# Patient Record
Sex: FEMALE | Race: ASIAN | NOT HISPANIC OR LATINO | Employment: FULL TIME | URBAN - METROPOLITAN AREA
[De-identification: names, ages, dates, MRNs, and addresses within clinical notes are randomized per-mention and may not be internally consistent; named-entity substitution may affect disease eponyms.]

---

## 2017-06-27 ENCOUNTER — OFFICE VISIT (OUTPATIENT)
Dept: INTERNAL MEDICINE | Facility: MEDICAL CENTER | Age: 37
End: 2017-06-27
Payer: MEDICAID

## 2017-06-27 VITALS
HEIGHT: 64 IN | WEIGHT: 187.25 LBS | SYSTOLIC BLOOD PRESSURE: 108 MMHG | OXYGEN SATURATION: 98 % | TEMPERATURE: 97.9 F | DIASTOLIC BLOOD PRESSURE: 76 MMHG | HEART RATE: 75 BPM | BODY MASS INDEX: 31.97 KG/M2

## 2017-06-27 DIAGNOSIS — N94.6 DYSMENORRHEA: ICD-10-CM

## 2017-06-27 DIAGNOSIS — N80.9 ENDOMETRIOSIS: ICD-10-CM

## 2017-06-27 DIAGNOSIS — R68.89 HEAT INTOLERANCE: ICD-10-CM

## 2017-06-27 DIAGNOSIS — K21.9 GASTROESOPHAGEAL REFLUX DISEASE WITHOUT ESOPHAGITIS: ICD-10-CM

## 2017-06-27 DIAGNOSIS — G43.019 INTRACTABLE MIGRAINE WITHOUT AURA AND WITHOUT STATUS MIGRAINOSUS: ICD-10-CM

## 2017-06-27 DIAGNOSIS — R14.0 BLOATING: ICD-10-CM

## 2017-06-27 DIAGNOSIS — Z83.3 FAMILY HISTORY OF DIABETES MELLITUS: ICD-10-CM

## 2017-06-27 DIAGNOSIS — E66.09 NON MORBID OBESITY DUE TO EXCESS CALORIES: ICD-10-CM

## 2017-06-27 PROBLEM — G43.009 MIGRAINE WITHOUT AURA: Status: ACTIVE | Noted: 2017-06-27

## 2017-06-27 PROCEDURE — 99204 OFFICE O/P NEW MOD 45 MIN: CPT | Performed by: INTERNAL MEDICINE

## 2017-06-27 RX ORDER — SUMATRIPTAN 50 MG/1
50 TABLET, FILM COATED ORAL
Qty: 10 TAB | Refills: 3 | Status: SHIPPED | OUTPATIENT
Start: 2017-06-27 | End: 2018-10-01

## 2017-06-27 RX ORDER — NAPROXEN 500 MG/1
TABLET ORAL
Qty: 30 TAB | Refills: 1 | Status: SHIPPED | OUTPATIENT
Start: 2017-06-27 | End: 2018-10-01

## 2017-06-27 ASSESSMENT — PATIENT HEALTH QUESTIONNAIRE - PHQ9: CLINICAL INTERPRETATION OF PHQ2 SCORE: 0

## 2017-06-27 NOTE — PROGRESS NOTES
New Patient to Establish    Reason to establish: New patient to establish    CC: Here to establish and discuss abdominal bloating and migraines    HPI: Ms. David is a 37-year-old woman with a history of migraines who presents as a new patient.  She states that she was diagnosed with migraines around the age of 16. She is evaluated by a neurologist approximately 10 years ago who she thinks prescribed a prophylactic medication. She thinks she had a head CT 10 years ago that was negative for an obvious cause of migraines. She stopped taking it after one or 2 months. She does not remember why. She has headaches approximately 2 or 3 times per month. The headaches can last from hours to at the most 2 days. On average headaches last 6-12 hours. The headaches are described as dull mostly in the left temporal region. There is associated nausea but no vomiting. There is no aura prior to development of head pain. She has triggers of dehydration and lack of food. 800 mg of ibuprofen can be effective for aborting the headache, however she does not take this until one or 2 hours as passed. She is rather adverse to taking medications for migraines. Applying pressure to her glabellar region or bilateral occipital regions in her skull can help with the pain. Occasionally caffeinated products such as soda can help to improve the headache. The headache burden is about a 5 out of 10 on severity or quality of life. She has never tried abortive agents including triptan therapy. She denies any seizures, numbness, weakness, bowel or bladder problems or change in vision.    In October 2016, she presented to the emergency department with abdominal pain and nausea. She states that she has significant bloating that occurred over a 24-hour period with nonspecific pain in the epigastric region and nausea. She had no right upper quadrant pain. She also had loose stool but no melena no hematochezia hematemesis or vomiting. An ultrasound in the  emergency department revealed gallbladder stones and sludge but no cholecystitis. She was given morphine and fluids and most her symptoms resolved by the time she left the emergency department. It was unlikely that she had cholecystitis or appendicitis. Since then she describes occasional symptoms of bloating and epigastric pain with heartburn. This occurs about 2 times per month. She takes over-the-counter omeprazole which usually works to relieve all of the symptoms. She states that there is a strong relationship between the symptoms and eating spicy foods, greasy foods, drinking alcohol or drinking sodas. Usually if she avoids these foods she has no problems. She denies any abdominal pain nausea or vomiting outside of these events. She does not smoke tobacco she drinks alcohol rarely however she does use peppermint oil because someone told her that I can help with abdominal pain. She denies any melena, hematochezia, hematemesis, weight loss, odynophagia, dysphagia.    She has a history of endometriosis but currently does not have a gynecologist. Associated with this has been dysmenorrhea. She also describes irregular menses. She did consult  earlier this year. Unfortunately she was unable to conceive. She currently is not working on that.          Patient Active Problem List    Diagnosis Date Noted   • Migraine without aura 06/27/2017   • Gastroesophageal reflux disease without esophagitis 06/27/2017   • Bloating 06/27/2017   • Non morbid obesity due to excess calories 06/27/2017   • Endometriosis 06/27/2017       Past Medical History   Diagnosis Date   • Endometriosis        Current Outpatient Prescriptions   Medication Sig Dispense Refill   • naproxen (NAPROSYN) 500 MG Tab Take one tablet at onset of migraine as needed 30 Tab 1   • sumatriptan (IMITREX) 50 MG Tab Take 1 Tab by mouth Once PRN for Migraine for up to 1 dose. 10 Tab 3     No current facility-administered medications for this  "visit.       Allergies as of 06/27/2017 - Sahil as Reviewed 06/27/2017   Allergen Reaction Noted   • Penicillins Anaphylaxis 07/17/2007   • Promethazine hcl  07/17/2007       Social History     Social History   • Marital Status: Single     Spouse Name: N/A   • Number of Children: N/A   • Years of Education: N/A     Occupational History   • Not on file.     Social History Main Topics   • Smoking status: Never Smoker    • Smokeless tobacco: Never Used   • Alcohol Use: No   • Drug Use: No   • Sexual Activity: Not on file     Other Topics Concern   • Not on file     Social History Narrative       No family history on file.    Past Surgical History   Procedure Laterality Date   • Other orthopedic surgery Left      shoulder       ROS: As per HPI. Additional pertinent symptoms as noted below.    Constitutional: she denies any weight loss, fevers, chills. Admits to significant heat intolerance  Eyes: she denies any recent change in vision, eye pain, redness, double vision, loss of vision  ENT: denies any sinus pain, allergies, sneezing, rhinorrhea, ear pain, decreased hearing, tinnitus  Cardiovascular: denies chest pain, palpitations, orthopnea  Respiratory: denies any cough, hemoptysis, shortness of breath, wheezing  GI: denies constipation or diarrhea melena or hematochezia hematemesis  : denies dysuria.. Does admit to relatively painful menstrual events.  Musculo-skeletal: states that she has intermittent knee and wrist pain without erythema, edema or increased temp  Skin: no atypical nevi, ulcerations or rash  Neurological: no seizures, weakness, numbness  Psychological: no depression or anxiety    /76 mmHg  Pulse 75  Temp(Src) 36.6 °C (97.9 °F)  Ht 1.619 m (5' 3.74\")  Wt 84.936 kg (187 lb 4 oz)  BMI 32.40 kg/m2  SpO2 98%    Physical Exam  General:  Alert and oriented, No apparent distress.    Eyes: Pupils equal and reactive. No scleral icterus. No sinus pressure or tenderness to palpation Throat: Clear no " erythema or exudates noted. Bilaterally enlarged, cryptic tonsils     Neck: Supple. No lymphadenopathy noted. Thyroid not enlarged.    Lungs: Clear to auscultation and percussion bilaterally.    Cardiovascular: Regular rate and rhythm. No murmurs, rubs or gallops.    Abdomen:  Benign. No rebound or guarding noted.    Extremities: No clubbing, cyanosis, edema.    Skin: Clear. No rash or suspicious skin lesions noted.    Other: Neuro: Cranial nerves II through XII intact.      Assessment and Plan    1. Intractable migraine without aura and without status migrainosus  She most likely has migraines. She has no red flags for deleterious causes a headache. She has headaches 2 or 3 times per month that did not significantly impact her quality of life therefore at this point prophylactic therapy probably not offer much benefit. I did suggest that she try Imitrex plus naproxen at onset of headache. I will send a prescription of both drugs to her pharmacy    2. Gastroesophageal reflux disease without esophagitis  Suspect most of her GI symptoms are secondary to gastric esophageal reflux disease. There is a strong correlation between her diet and reoccurrence of her symptoms. I suggest that she lose weight and stop using peppermint oil. She may also consider going on a reflux friendly diet. At this point she does not have red flags to suggest the need for an EGD.    3. Bloating  Suspectthis is secondary to her poor diet. She eats a lot of caffeinated products and processed foods.    4. Non morbid obesity due to excess calories  Encouraged patient to lose weight which may help her with #2. She should focus on her diet first    5. Heat intolerance  She has complained of heat intolerance per her account moderately severe. Thyroid exam was negative for nodules or goiter. She states that her menstrual periods are becoming irregular over the last year. May be reasonable to check for premature ovarian failure but this is probably not  likely. We'll order TSH and FSH, prolactin, estrogen    6. Dysmenorrhea  She will follow-up with a gynecologist and set up an appointment    7. Family history of diabetes mellitus  Screening her for diabetes and check her lipid panel      Followup: Return in about 6 months (around 12/27/2017).    Signed by: Nael Sweeney M.D.

## 2017-07-24 ENCOUNTER — TELEPHONE (OUTPATIENT)
Dept: INTERNAL MEDICINE | Facility: MEDICAL CENTER | Age: 37
End: 2017-07-24

## 2017-07-24 NOTE — TELEPHONE ENCOUNTER
1. Caller Name: Pt                      Call Back Number: [phone] (home)     2. Message: Patient called and left voicemail asking about the results. Scanned into media and routed over to Dr. Sweeney    3. Patient approves office to leave a detailed voicemail/MyChart message: N\A

## 2017-07-25 DIAGNOSIS — K21.9 GASTROESOPHAGEAL REFLUX DISEASE WITHOUT ESOPHAGITIS: ICD-10-CM

## 2017-07-25 DIAGNOSIS — G43.019 INTRACTABLE MIGRAINE WITHOUT AURA AND WITHOUT STATUS MIGRAINOSUS: ICD-10-CM

## 2017-07-25 DIAGNOSIS — R14.0 BLOATING: ICD-10-CM

## 2017-07-25 DIAGNOSIS — R68.89 HEAT INTOLERANCE: ICD-10-CM

## 2017-07-25 DIAGNOSIS — N94.6 DYSMENORRHEA: ICD-10-CM

## 2017-07-25 NOTE — TELEPHONE ENCOUNTER
Patient was worried about labs. Was able to contact her at her cell number. Home number was a no longer working # for her. Let her know Dr. Sweeney would like to get her to come in to see a resident. She preferred not to come in and just discuss over the phone. I let her know that, that is fine and I will be mailing a copy to her as well.

## 2017-07-25 NOTE — TELEPHONE ENCOUNTER
Called patient again. No answer and unable to leave voicemail.  Patient should set up appointment to discuss labs within resident clinic.

## 2018-03-05 ENCOUNTER — HOSPITAL ENCOUNTER (OUTPATIENT)
Dept: LAB | Facility: MEDICAL CENTER | Age: 38
End: 2018-03-05
Attending: SPECIALIST
Payer: MEDICAID

## 2018-03-07 LAB — CYTOLOGY REG CYTOL: NORMAL

## 2018-08-16 NOTE — H&P
DATE OF SCHEDULED SURGERY:  2018.    REASON FOR SURGERY:  Symptomatic uterine fibroids, associated incapacitating   pelvic pain, dyspareunia, symptomatic pelvic floor relaxation, urinary   incontinence.    HISTORY OF PRESENT ILLNESS:  A 38-year-old  2, para 1, spontaneous    1, negative urine pregnancy test with a progressively worsening   history of bleeding, which has been refractory to hormonal regimens in the   past, now with an enlarged uterus secondary to multiple uterine fibroids,   largest fibroid measuring 5.75x4.32.  The uterus is enlarged measuring   9.08x10.81x10.22.  There are multiple uterine fibroids located throughout the   uterus including pedunculated subserosal and submucosal fibroids near the   lower uterine segment to the right a 2.68x2.58 subserosal pedunculated fibroid   is noted.  Endometrial stripe thickness was difficult to discern given the   submucosal fibroids.  Patient did have an otherwise normal pelvis with trace   free fluid noted.  She was placed on Provera regimen for which she failed.    She has a history of failure to oral contraceptives.  She states that she has   exhausted all conservative management and is now interested in proceeding   forward with attempted definitive surgical correction.  She has proceeded   forward with a workup including endometrial biopsy, which showed proliferative   phase endometrium with acute endometritis for which she was given an   antibiotic course.  She had normal thyroid function study.  Urodynamic studies   did demonstrate and confirm type 2 stress urinary incontinence.  The patient   states that she has exhausted all conservative measures including hormonal   therapy, pelvic floor exercises and other conservative measures, now   interested in proceeding forward with attempted definitive surgical correction   with laparoscopic-assisted vaginal hysterectomy, bilateral salpingectomy.    She is interested in proceeding forward  with ovarian conservation, anterior   and posterior vaginal repair, enterocele repair, perineoplasty, sacrospinous   vault suspension, transobturator tape midurethral sling procedure.  Risks,   benefits, alternatives of all individual portions of the surgery were   addressed with the patient in detail over several visits.  She has asked   appropriate questions, signed the appropriate consents and is wishing to   proceed forward with surgery as planned.  Of note, the patient does understand   the limitations of surgery and addressing her pelvic pain, dyspareunia, and   occasional overactive bladder symptoms, which she states she understands may   be unimproved or worsen with the surgery as described above requiring   additional medical or surgical management even understanding these   limitations, she is wishing to proceed forward with the surgery nonetheless.    PAST MEDICAL HISTORY:  Migraine headaches, otherwise as stated above.    PAST SURGICAL HISTORY:  Left shoulder surgery and laparoscopic surgery for   endometriosis in  and .    OBSTETRICAL HISTORY:  The patient had 1 previous birth vaginally in  and 1   spontaneous .    GYNECOLOGIC HISTORY:  The patient reports completely irregular cycles, which   she describes excessive with passage of large clots with incapacitating   dysmenorrhea, dyspareunia, pelvic pain, large bulge at the vaginal introitus   consistent with significant prolapse, mixed urinary incontinence symptoms with   the predominance of type 2 stress urinary incontinence which was   demonstrating and confirmed on urodynamic study.  She denies any previous   sexually transmitted diseases or pelvic infections.    SOCIAL HISTORY:  She is single.  She denies the use of any alcohol, tobacco,   or recreational drug use.    FAMILY HISTORY:  Noncontributory.  Rest of the review of systems is   unremarkable.    PHYSICAL EXAMINATION:  GENERAL:  She is afebrile, hemodynamically  stable.  VITAL SIGNS:  Can be seen in the EMR.  HEART:  Regular rate and rhythm.  CHEST:  Clear to auscultation bilaterally.  ABDOMEN:  Soft, nondistended, nontender, bowel sounds are present.  PELVIC:  Exam shows an enlarged fibroid uterus with tenderness to palpation at   the uterine fundus.  No adnexal masses were appreciated on bimanual   examination.  Rectovaginal exam was deferred.  EXTREMITIES:  Nontender.      Urodynamic studies did demonstrate and confirm type 2 stress urinary   incontinence.  Remainder of the imaging and laboratory studies are as stated   above.    ASSESSMENT AND PLAN:  A 38-year-old  2, para 1 with symptomatic uterine   fibroid, history of endometriosis, refractory to multiple hormonal regimens   with menometrorrhagia, dysmenorrhea, pelvic pain, dyspareunia, symptomatic   pelvic floor relaxation, mixed urinary incontinence symptoms, strong type 2   stress urinary incontinence component is now interested in proceeding forward   with surgery as described above, even in light of the possibility and   limitations of surgery and not addressing her pain and dyspareunia, especially   in light of her history of endometriosis, she wishes to proceed forward with   surgery nonetheless on 2018.       ____________________________________     MD MIMA ALICEA / KAYLA    DD:  2018 20:45:12  DT:  2018 23:04:51    D#:  [id number]  Job#:  [id number]

## 2018-08-21 DIAGNOSIS — Z01.812 PRE-OPERATIVE LABORATORY EXAMINATION: ICD-10-CM

## 2018-08-21 LAB
ANION GAP SERPL CALC-SCNC: 8 MMOL/L (ref 0–11.9)
BUN SERPL-MCNC: 12 MG/DL (ref 8–22)
CALCIUM SERPL-MCNC: 9.6 MG/DL (ref 8.5–10.5)
CHLORIDE SERPL-SCNC: 105 MMOL/L (ref 96–112)
CO2 SERPL-SCNC: 27 MMOL/L (ref 20–33)
CREAT SERPL-MCNC: 0.54 MG/DL (ref 0.5–1.4)
ERYTHROCYTE [DISTWIDTH] IN BLOOD BY AUTOMATED COUNT: 40 FL (ref 35.9–50)
GLUCOSE SERPL-MCNC: 95 MG/DL (ref 65–99)
HCT VFR BLD AUTO: 41 % (ref 37–47)
HGB BLD-MCNC: 13.5 G/DL (ref 12–16)
MCH RBC QN AUTO: 27.4 PG (ref 27–33)
MCHC RBC AUTO-ENTMCNC: 32.9 G/DL (ref 33.6–35)
MCV RBC AUTO: 83.2 FL (ref 81.4–97.8)
PLATELET # BLD AUTO: 210 K/UL (ref 164–446)
PMV BLD AUTO: 11.8 FL (ref 9–12.9)
POTASSIUM SERPL-SCNC: 3.8 MMOL/L (ref 3.6–5.5)
RBC # BLD AUTO: 4.93 M/UL (ref 4.2–5.4)
SODIUM SERPL-SCNC: 140 MMOL/L (ref 135–145)
WBC # BLD AUTO: 6.9 K/UL (ref 4.8–10.8)

## 2018-08-21 PROCEDURE — 36415 COLL VENOUS BLD VENIPUNCTURE: CPT

## 2018-08-21 PROCEDURE — 85027 COMPLETE CBC AUTOMATED: CPT

## 2018-08-21 PROCEDURE — 80048 BASIC METABOLIC PNL TOTAL CA: CPT

## 2018-09-04 ENCOUNTER — HOSPITAL ENCOUNTER (OUTPATIENT)
Facility: MEDICAL CENTER | Age: 38
End: 2018-09-04
Attending: SPECIALIST | Admitting: SPECIALIST
Payer: MEDICAID

## 2018-09-04 VITALS
TEMPERATURE: 97 F | DIASTOLIC BLOOD PRESSURE: 69 MMHG | OXYGEN SATURATION: 96 % | RESPIRATION RATE: 17 BRPM | WEIGHT: 192.68 LBS | BODY MASS INDEX: 34.14 KG/M2 | HEART RATE: 77 BPM | SYSTOLIC BLOOD PRESSURE: 133 MMHG | HEIGHT: 63 IN

## 2018-09-04 LAB
B-HCG FREE SERPL-ACNC: <5 MIU/ML
IHCGL IHCGL: NEGATIVE MIU/ML

## 2018-09-04 PROCEDURE — A9270 NON-COVERED ITEM OR SERVICE: HCPCS

## 2018-09-04 PROCEDURE — 500854 HCHG NEEDLE, INSUFFLATION FOR STEP: Performed by: SPECIALIST

## 2018-09-04 PROCEDURE — 501577 HCHG TROCAR, STEP 11MM: Performed by: SPECIALIST

## 2018-09-04 PROCEDURE — 160009 HCHG ANES TIME/MIN: Performed by: SPECIALIST

## 2018-09-04 PROCEDURE — 700101 HCHG RX REV CODE 250

## 2018-09-04 PROCEDURE — 160036 HCHG PACU - EA ADDL 30 MINS PHASE I: Performed by: SPECIALIST

## 2018-09-04 PROCEDURE — 84702 CHORIONIC GONADOTROPIN TEST: CPT

## 2018-09-04 PROCEDURE — 501838 HCHG SUTURE GENERAL: Performed by: SPECIALIST

## 2018-09-04 PROCEDURE — 700102 HCHG RX REV CODE 250 W/ 637 OVERRIDE(OP)

## 2018-09-04 PROCEDURE — 502703 HCHG DEVICE, LIGASURE V SEALER: Performed by: SPECIALIST

## 2018-09-04 PROCEDURE — 501330 HCHG SET, CYSTO IRRIG TUBING: Performed by: SPECIALIST

## 2018-09-04 PROCEDURE — 160048 HCHG OR STATISTICAL LEVEL 1-5: Performed by: SPECIALIST

## 2018-09-04 PROCEDURE — 500886 HCHG PACK, LAPAROSCOPY: Performed by: SPECIALIST

## 2018-09-04 PROCEDURE — 501579 HCHG TROCAR, STEP 5MM: Performed by: SPECIALIST

## 2018-09-04 PROCEDURE — 502240 HCHG MISC OR SUPPLY RC 0272: Performed by: SPECIALIST

## 2018-09-04 PROCEDURE — 700111 HCHG RX REV CODE 636 W/ 250 OVERRIDE (IP)

## 2018-09-04 PROCEDURE — A4338 INDWELLING CATHETER LATEX: HCPCS | Performed by: SPECIALIST

## 2018-09-04 PROCEDURE — C1771 REP DEV, URINARY, W/SLING: HCPCS | Performed by: SPECIALIST

## 2018-09-04 PROCEDURE — 160041 HCHG SURGERY MINUTES - EA ADDL 1 MIN LEVEL 4: Performed by: SPECIALIST

## 2018-09-04 PROCEDURE — 88307 TISSUE EXAM BY PATHOLOGIST: CPT

## 2018-09-04 PROCEDURE — 160035 HCHG PACU - 1ST 60 MINS PHASE I: Performed by: SPECIALIST

## 2018-09-04 PROCEDURE — 160002 HCHG RECOVERY MINUTES (STAT): Performed by: SPECIALIST

## 2018-09-04 PROCEDURE — 160029 HCHG SURGERY MINUTES - 1ST 30 MINS LEVEL 4: Performed by: SPECIALIST

## 2018-09-04 DEVICE — SLING TOT OBTRYX I HALO: Type: IMPLANTABLE DEVICE | Site: BLADDER | Status: FUNCTIONAL

## 2018-09-04 RX ORDER — METOCLOPRAMIDE HYDROCHLORIDE 5 MG/ML
10 INJECTION INTRAMUSCULAR; INTRAVENOUS EVERY 4 HOURS PRN
Status: DISCONTINUED | OUTPATIENT
Start: 2018-09-04 | End: 2018-09-04 | Stop reason: HOSPADM

## 2018-09-04 RX ORDER — BUPIVACAINE HYDROCHLORIDE AND EPINEPHRINE 2.5; 5 MG/ML; UG/ML
INJECTION, SOLUTION EPIDURAL; INFILTRATION; INTRACAUDAL; PERINEURAL
Status: DISCONTINUED | OUTPATIENT
Start: 2018-09-04 | End: 2018-09-04 | Stop reason: HOSPADM

## 2018-09-04 RX ORDER — EPINEPHRINE 1 MG/ML
INJECTION INTRAMUSCULAR; INTRAVENOUS; SUBCUTANEOUS
Status: DISCONTINUED
Start: 2018-09-04 | End: 2018-09-04 | Stop reason: HOSPADM

## 2018-09-04 RX ORDER — BUPIVACAINE HYDROCHLORIDE 2.5 MG/ML
INJECTION, SOLUTION EPIDURAL; INFILTRATION; INTRACAUDAL
Status: DISCONTINUED
Start: 2018-09-04 | End: 2018-09-04 | Stop reason: HOSPADM

## 2018-09-04 RX ORDER — ONDANSETRON 2 MG/ML
4 INJECTION INTRAMUSCULAR; INTRAVENOUS EVERY 6 HOURS PRN
Status: DISCONTINUED | OUTPATIENT
Start: 2018-09-04 | End: 2018-09-04 | Stop reason: HOSPADM

## 2018-09-04 RX ORDER — OXYCODONE HCL 5 MG/5 ML
SOLUTION, ORAL ORAL
Status: COMPLETED
Start: 2018-09-04 | End: 2018-09-04

## 2018-09-04 RX ORDER — SODIUM CHLORIDE, SODIUM LACTATE, POTASSIUM CHLORIDE, CALCIUM CHLORIDE 600; 310; 30; 20 MG/100ML; MG/100ML; MG/100ML; MG/100ML
INJECTION, SOLUTION INTRAVENOUS CONTINUOUS
Status: DISCONTINUED | OUTPATIENT
Start: 2018-09-04 | End: 2018-09-04 | Stop reason: HOSPADM

## 2018-09-04 RX ORDER — ACETAMINOPHEN 500 MG
1000 TABLET ORAL EVERY 6 HOURS
Status: DISCONTINUED | OUTPATIENT
Start: 2018-09-04 | End: 2018-09-04 | Stop reason: HOSPADM

## 2018-09-04 RX ORDER — OXYCODONE HYDROCHLORIDE 5 MG/1
10 TABLET ORAL
Status: DISCONTINUED | OUTPATIENT
Start: 2018-09-04 | End: 2018-09-04 | Stop reason: HOSPADM

## 2018-09-04 RX ORDER — SIMETHICONE 80 MG
80 TABLET,CHEWABLE ORAL EVERY 8 HOURS PRN
Status: DISCONTINUED | OUTPATIENT
Start: 2018-09-04 | End: 2018-09-04 | Stop reason: HOSPADM

## 2018-09-04 RX ORDER — ONDANSETRON 2 MG/ML
INJECTION INTRAMUSCULAR; INTRAVENOUS
Status: COMPLETED
Start: 2018-09-04 | End: 2018-09-04

## 2018-09-04 RX ORDER — MEPERIDINE HYDROCHLORIDE 25 MG/ML
INJECTION INTRAMUSCULAR; INTRAVENOUS; SUBCUTANEOUS
Status: COMPLETED
Start: 2018-09-04 | End: 2018-09-04

## 2018-09-04 RX ORDER — BUPIVACAINE HYDROCHLORIDE AND EPINEPHRINE 2.5; 5 MG/ML; UG/ML
INJECTION, SOLUTION INFILTRATION; PERINEURAL
Status: DISCONTINUED | OUTPATIENT
Start: 2018-09-04 | End: 2018-09-04 | Stop reason: HOSPADM

## 2018-09-04 RX ADMIN — FENTANYL CITRATE 50 MCG: 50 INJECTION, SOLUTION INTRAMUSCULAR; INTRAVENOUS at 15:24

## 2018-09-04 RX ADMIN — FENTANYL CITRATE 25 MCG: 50 INJECTION, SOLUTION INTRAMUSCULAR; INTRAVENOUS at 17:30

## 2018-09-04 RX ADMIN — OXYCODONE HYDROCHLORIDE 10 MG: 5 SOLUTION ORAL at 14:03

## 2018-09-04 RX ADMIN — FENTANYL CITRATE 50 MCG: 50 INJECTION, SOLUTION INTRAMUSCULAR; INTRAVENOUS at 13:51

## 2018-09-04 RX ADMIN — SODIUM CHLORIDE, SODIUM LACTATE, POTASSIUM CHLORIDE, CALCIUM CHLORIDE 1000 ML: 600; 310; 30; 20 INJECTION, SOLUTION INTRAVENOUS at 09:20

## 2018-09-04 RX ADMIN — ONDANSETRON 4 MG: 2 INJECTION INTRAMUSCULAR; INTRAVENOUS at 16:52

## 2018-09-04 RX ADMIN — MEPERIDINE HYDROCHLORIDE 25 MG: 25 INJECTION INTRAMUSCULAR; INTRAVENOUS; SUBCUTANEOUS at 13:40

## 2018-09-04 ASSESSMENT — PAIN SCALES - GENERAL
PAINLEVEL_OUTOF10: 5
PAINLEVEL_OUTOF10: 5
PAINLEVEL_OUTOF10: 6
PAINLEVEL_OUTOF10: 8
PAINLEVEL_OUTOF10: 6
PAINLEVEL_OUTOF10: 10
PAINLEVEL_OUTOF10: 7
PAINLEVEL_OUTOF10: 5
PAINLEVEL_OUTOF10: 6
PAINLEVEL_OUTOF10: 0
PAINLEVEL_OUTOF10: 6
PAINLEVEL_OUTOF10: 4
PAINLEVEL_OUTOF10: 6

## 2018-09-04 NOTE — OP REPORT
DATE OF SERVICE:  9/4/2018     PREOPERATIVE DIAGNOSES:  Symptomatic uterine fibroids, associated incapacitating   pelvic pain, dyspareunia, symptomatic pelvic floor relaxation, urinary   incontinence.     POSTOPERATIVE DIAGNOSES:  Same     FINAL PATHOLOGY:  Pending.     PROCEDURES PERFORMED:  Procedure(s):  VAGINAL HYSTERECTOMY SCOPE TOTAL - Wound Class: Clean Contaminated  SALPINGECTOMY - Wound Class: Clean Contaminated  ANTERIOR AND POSTERIOR REPAIR - PERINEOPLASTY - Wound Class: Clean Contaminated  ENTEROCELE REPAIR - Wound Class: Clean Contaminated  BLADDER SLING FEMALE - TOT - Wound Class: Clean Contaminated  VAGINAL SUSPENSION - SACROSPINOUS VAULT - Wound Class: Clean Contaminated     SURGEON:  Atul Aguilera MD.     ASSISTANT:  Maco Ybarra MD.     ANESTHESIA:  General     ANESTHESIOLOGIST:  Anesthesiologist: Aida Chiu M.D.     ESTIMATED BLOOD LOSS FOR THE PROCEDURE:  100 mL.     FINDINGS:  Bulbous fibroid uterus weighing 517 gms with normal appearing adnexa and pelvis. Good support of the anterior and posterior vaginal  walls in addition to apical vaginal tissue without any undue tension in the  suture sites.  Cystoscopy revealed normal urinary bladder, bilateral ureteral  efflux, and no undue tension noted at the level of the mid urethra after a sling placement on cystoscopic evaluation.     DESCRIPTION OF PROCEDURE:  The patient was taken to the operating room, where  general anesthesia was performed without difficulty.  The patient was then    prepped and draped in usual sterile fashion with lower extremities placed in    Bill stirrups.  Attention was first turned to the perineum, where a weighted    speculum was placed with the use of Galaviz retractor.  Single-toothed tenaculum    was placed on the anterior lip of the cervix.  Hulka uterine manipulator was    then placed.  Single-toothed tenaculum and retractors were then removed.     Attention was then turned to the umbilicus, where a 1 cm  incision was made.     Veress needle was placed, 3.5 L of carboperitoneum were then obtained.  A 10    mm trocar port was then placed.  Two separate ports were placed approximately    one-third of the way from the anterior-superior iliac spine lateral to the    rectus muscle under direct laparoscopic visualization with 5 mm ports placed.     Attention was then turned to the perineum, where a weighted speculum was    placed with the use of Galaviz retractors.  Single-toothed tenaculum was placed    on the anterior lip of the cervix.  Hulka uterine manipulator was then placed.    Single-toothed tenaculum and retractor was then removed.  Attention was then   turned to the umbilicus, where a 1 cm incision was made.  A Veress needle was   placed, 3.5 L of carboperitoneum were then obtained.  A 10 mm trocar port was   then placed.  Two separate ports were placed approximately one-third of the    way from the anterior-superior iliac spine lateral to the rectus muscle under    direct laparoscopic visualization.  Attention was then turned to the distal    portion of the fallopian tubes, which were grasped just below the fallopian    tube above the ovary.  This area was grasped, cauterized, and transected on    each side.  The mesosalpinx underneath the fallopian tube was then grasped,    cauterized, and transected all the way to the level of the uterus, freeing up    the fallopian tube on each side.  The uteroovarian, broad, and round ligaments   were individually isolated, cauterized, and transected.  The vesicouterine    peritoneum was grasped, incised, and the bladder flap was created.  Uterine    vessels were then clamped, cauterized, and transected  on each side.  The    vesicouterine peritoneum was then completed.  Attention was then turned to the   perineum, where a weighted speculum was placed with the use of Galaviz    retractor.  A thyroid tenaculum was placed on the anterior lip, one on the    posterior lips of the  cervix.  Cervix was then injected with 10 mL of 0.25%    Marcaine with epinephrine.  Incision was made starting anteriorly, proceeding    posterior, proceeding back anterior once again.  With the use of Bovie    electrocautery, after injecting 10 mL of 0.25% Marcaine with epinephrine, the    anterior cul-de-sac was entered sharply.  Right angle Talon retractor was    placed upon sharp entry in the anterior cul-de-sac.  Large duck billed    speculum was placed upon sharp entry in the posterior cul-de-sac.  Uterosacral   cardinal complexes were then grasped with ZED clamps, transected, and suture    ligated with 0 Vicryl suture bilaterally.  Attention was then turned to the    perineum, where the uterus and both fallopian tubes were then handed off the    field as specimen after morcellation of the central core portion of the uterus was removed to allow easy removal of remaining portion of the uterus and fallopian tubes.  Excellent hemostasis noted at all vascular pedicles. The anterior and posterior leaf of the peritoneum in addition to the uterosacral    cardinal complexes were reapproximated in the midline with a pursestring    suture using 2-0 Vicryl.  The vaginal cuff was then reapproximated with    figure-of-eight sutures using 0 Vicryl reapproximating both uterosacral    cardinal complex and respective vaginal apices.  The anterior vaginal mucosa    was then injected with 10 mL of 0.25% Marcaine with epinephrine.  The vaginal    mucosa was then dissected off the underlying pubocervical fascia tara until    the levator muscles were then reached.  Horizontal mattress sutures were then    used to reapproximate the pubocervical fascia in midline in a double 0    closure, thereby reducing the midline cystocele.  A 10 mL of 0.25% Marcaine    with epinephrine were injected with 5 mL on the right and 5 mL the left    lateral to the clitoris in the labial crural fold followed by stab incision    made with the use of  #11 blade scalpel followed by placement of Obtryx halo    needles through the labial crural incision sites through the obturator    membrane out through the vaginal mucosal incision at the level of the mid    urethra.  The sling was then applied to the respective Obtryx halo needles and   it was taken back out through their original track.  Tensioning and position    was then performed.  Merino catheter was removed, which had been placed at the    beginning of the case for placement of a 30-degree cystoscope, which revealed    normal urinary bladder,  bilateral ureteral efflux, and no undue tension noted   at the level of the mid urethra.  The sling had been released under direct    cystoscopic evaluation.  Tensioning position was then finalized.  Cystoscope    removed.  Merino catheter replaced.  All excess vaginal mucosa and sling    material were then excised.  The vaginal mucosa was then reapproximated with    2-0 Vicryl in a running interlocking fashion in one segment.  Posterior    vaginal mucosa was then injected with 10 mL of 0.25% Marcaine with    epinephrine.  The vaginal mucosa was then dissected off the underlying    rectovaginal fascia tara until the levator muscles were then reached.     Dissection of the sacrospinous process and ischial spine was then performed on   the right, 3 cm medial along the sacrospinous ligament with straight    catheterization needle device, 0 Ethibond suture was taken through the    sacrospinous ligament and taken out through the right apical portion of the    vaginal cuff on the underlying vaginal mucosa.  The rectovaginal septum was    then reapproximated in the posterior aspect of the vaginal cuff and a double    pursestring suture, thereby reducing a large enterocele located at the    superior aspect of the dissection.  Horizontal mattress suture was then used    to reapproximate the rectovaginal fascia in the midline in a double 0 closure,   thereby reducing the midline  rectocele.  Excess vaginal mucosa was then    trimmed.  The vaginal mucosa was then reapproximated with 2-0 Vicryl in a    running interlocking fashion in one segment for a perineoplasty on the    perineum.  Attention was then turned back to the abdomen and pelvis.  Pelvis    was inspected and noted to be hemostatic, 3.5 L of carboperitoneum removed    followed by removal of the ports under direct laparoscopic visualization.  The   incisions were then reapproximated with single interrupted sutures using 4-0    Vicryl, injected with 20 mL of 0.25% Marcaine with epinephrine.  The patient    tolerated the procedure well and was awoken from general anesthesia, was taken   to the recovery room in stable condition.        ____________________________________     ABHIJIT KIM MD

## 2018-09-04 NOTE — OR SURGEON
Immediate Post OP Note    PreOp Diagnosis: Symptomatic uterine fibroids, associated incapacitating   pelvic pain, dyspareunia, symptomatic pelvic floor relaxation, urinary   incontinence.    PostOp Diagnosis: Same; final pathology pending    Procedure(s):  VAGINAL HYSTERECTOMY SCOPE TOTAL - Wound Class: Clean Contaminated  SALPINGECTOMY - Wound Class: Clean Contaminated  ANTERIOR AND POSTERIOR REPAIR - PERINEOPLASTY - Wound Class: Clean Contaminated  ENTEROCELE REPAIR - Wound Class: Clean Contaminated  BLADDER SLING FEMALE - TOT - Wound Class: Clean Contaminated  VAGINAL SUSPENSION - SACROSPINOUS VAULT - Wound Class: Clean Contaminated    Surgeon(s):  YINKA Freed M.D.    Anesthesiologist/Type of Anesthesia:  Anesthesiologist: Aida Chiu M.D./General    Surgical Staff:  Circulator: Eileen Kirby R.N.  Relief Circulator: Samantha Nesbitt R.N.  Relief Scrub: Zaid Carnes  Scrub Person: Ana Brown    Specimens removed if any:  Uterus bilateral fallopian tubes     Estimated Blood Loss: 150ccs    Findings: Bulbous fibroid uterus weighing 517 gms with normal appearing adnexa and pelvis, good support of the anterior and the posterior and the apical vaginal tissue without any undue tension at any suture sites, cystoscopy revealed bilateral ureteral efflux, normal bladder and no undue tension at the mid urethra after sling placement.    Complications: None        9/4/2018 1:13 PM Atul Aguilera M.D.

## 2018-09-04 NOTE — DISCHARGE INSTRUCTIONS
ACTIVITY: Rest and take it easy for the first 24 hours.  A responsible adult is recommended to remain with you during that time.  It is normal to feel sleepy.  We encourage you to not do anything that requires balance, judgment or coordination.    MILD FLU-LIKE SYMPTOMS ARE NORMAL. YOU MAY EXPERIENCE GENERALIZED MUSCLE ACHES, THROAT IRRITATION, HEADACHE AND/OR SOME NAUSEA.    FOR 24 HOURS DO NOT:  Drive, operate machinery or run household appliances.  Drink beer or alcoholic beverages.   Make important decisions or sign legal documents.    SPECIAL INSTRUCTIONS: Call DR. Aguilera's office tomorrow morning to get the bentley catheter removed.   see hand-out for surgical instructions.    DIET: To avoid nausea, slowly advance diet as tolerated, avoiding spicy or greasy foods for the first day.  Add more substantial food to your diet according to your physician's instructions.  Babies can be fed formula or breast milk as soon as they are hungry.  INCREASE FLUIDS AND FIBER TO AVOID CONSTIPATION.    SURGICAL DRESSING/BATHING: may shower tomorrow after catheter is removed. No tub baths/swimming until cleared by doctor.     FOLLOW-UP APPOINTMENT:  A follow-up appointment should be arranged with your doctor; call to schedule.    You should CALL YOUR PHYSICIAN if you develop:  Fever greater than 101 degrees F.  Pain not relieved by medication, or persistent nausea or vomiting.  Excessive bleeding (blood soaking through dressing) or unexpected drainage from the wound.  Extreme redness or swelling around the incision site, drainage of pus or foul smelling drainage.  Inability to urinate or empty your bladder within 8 hours.  Problems with breathing or chest pain.    You should call 911 if you develop problems with breathing or chest pain.  If you are unable to contact your doctor or surgical center, you should go to the nearest emergency room or urgent care center.  Physician's telephone #: [phone]    If any questions arise,  call your doctor.  If your doctor is not available, please feel free to call the Surgical Center at [phone].  The Center is open Monday through Friday from 7AM to 7PM.  You can also call the HEALTH HOTLINE open 24 hours/day, 7 days/week and speak to a nurse at [phone], or toll free at [phone].    A registered nurse may call you a few days after your surgery to see how you are doing after your procedure.    MEDICATIONS: Resume taking daily medication.  Take prescribed pain medication with food.  If no medication is prescribed, you may take non-aspirin pain medication if needed.  PAIN MEDICATION CAN BE VERY CONSTIPATING.  Take a stool softener or laxative such as senokot, pericolace, or milk of magnesia if needed.    Prescription given for vicodin.  Last pain medication given at 2:03 pm.    If your physician has prescribed pain medication that includes Acetaminophen (Tylenol), do not take additional Acetaminophen (Tylenol) while taking the prescribed medication.    Depression / Suicide Risk    As you are discharged from this [facility] facility, it is important to learn how to keep safe from harming yourself.    Recognize the warning signs:  · Abrupt changes in personality, positive or negative- including increase in energy   · Giving away possessions  · Change in eating patterns- significant weight changes-  positive or negative  · Change in sleeping patterns- unable to sleep or sleeping all the time   · Unwillingness or inability to communicate  · Depression  · Unusual sadness, discouragement and loneliness  · Talk of wanting to die  · Neglect of personal appearance   · Rebelliousness- reckless behavior  · Withdrawal from people/activities they love  · Confusion- inability to concentrate     If you or a loved one observes any of these behaviors or has concerns about self-harm, here's what you can do:  · Talk about it- your feelings and reasons for harming yourself  · Remove any means that you  might use to hurt yourself (examples: pills, rope, extension cords, firearm)  · Get professional help from the community (Mental Health, Substance Abuse, psychological counseling)  · Do not be alone:Call your Safe Contact- someone whom you trust who will be there for you.  · Call your local CRISIS HOTLINE 621-7800 or 434-411-3350  · Call your local Children's Mobile Crisis Response Team [place] (748) 117-4207 or www.[employer]  · Call the toll free National Suicide Prevention Hotlines   · National Suicide Prevention Lifeline 944-667-FLQH (5961)  · National Hope Line Network 800-SUICIDE (957-7301)

## 2018-09-04 NOTE — OR NURSING
1324 Patient arrived from OR on [facility]. Report received from RN and Anesthesia. Patient's VS WNL, patient arousable, stable, breathing even/non labored.   1340 Patient shivering, demerol given  1351 Patient c/o 10/10  abdominal pain, medicated, see MAR  1524 Pt c/o of 7/10 pain, fentanyl given as ordered. Peripad checked, minimal bleeding noted  1641 discharge instructions reviewed with patient and spouse, copy given to spouse. [place] care also discussed. Per patient she is not ready to go home yet.  1652 Pt c/o nausea, zofran given, queaseease in use  1750 Patient got lightheaded and dizzy while getting dressed, resting right now  1920 Patients said she is ready to go. Patient still has mild dizziness, instructed pt to avoid sudden movements. PIV out. discharged via wheelchair. Belongings with patient.

## 2018-09-30 ENCOUNTER — HOSPITAL ENCOUNTER (OUTPATIENT)
Facility: MEDICAL CENTER | Age: 38
End: 2018-10-02
Attending: EMERGENCY MEDICINE | Admitting: SURGERY
Payer: MEDICAID

## 2018-09-30 DIAGNOSIS — K80.00 CALCULUS OF GALLBLADDER WITH ACUTE CHOLECYSTITIS WITHOUT OBSTRUCTION: ICD-10-CM

## 2018-09-30 PROCEDURE — 99285 EMERGENCY DEPT VISIT HI MDM: CPT

## 2018-09-30 ASSESSMENT — PAIN DESCRIPTION - DESCRIPTORS: DESCRIPTORS: ACHING;SHARP;DULL

## 2018-09-30 ASSESSMENT — PAIN SCALES - GENERAL: PAINLEVEL_OUTOF10: 10

## 2018-10-01 ENCOUNTER — APPOINTMENT (OUTPATIENT)
Dept: RADIOLOGY | Facility: MEDICAL CENTER | Age: 38
End: 2018-10-01
Attending: EMERGENCY MEDICINE
Payer: MEDICAID

## 2018-10-01 PROBLEM — K80.20 CHOLELITHIASIS: Status: ACTIVE | Noted: 2018-10-01

## 2018-10-01 LAB
ALBUMIN SERPL BCP-MCNC: 4 G/DL (ref 3.2–4.9)
ALBUMIN/GLOB SERPL: 1 G/DL
ALP SERPL-CCNC: 93 U/L (ref 30–99)
ALT SERPL-CCNC: 48 U/L (ref 2–50)
ANION GAP SERPL CALC-SCNC: 11 MMOL/L (ref 0–11.9)
AST SERPL-CCNC: 31 U/L (ref 12–45)
BASOPHILS # BLD AUTO: 0.7 % (ref 0–1.8)
BASOPHILS # BLD: 0.09 K/UL (ref 0–0.12)
BILIRUB SERPL-MCNC: 0.4 MG/DL (ref 0.1–1.5)
BUN SERPL-MCNC: 11 MG/DL (ref 8–22)
CALCIUM SERPL-MCNC: 9.8 MG/DL (ref 8.5–10.5)
CHLORIDE SERPL-SCNC: 105 MMOL/L (ref 96–112)
CO2 SERPL-SCNC: 21 MMOL/L (ref 20–33)
CREAT SERPL-MCNC: 0.7 MG/DL (ref 0.5–1.4)
EOSINOPHIL # BLD AUTO: 0.47 K/UL (ref 0–0.51)
EOSINOPHIL NFR BLD: 3.4 % (ref 0–6.9)
ERYTHROCYTE [DISTWIDTH] IN BLOOD BY AUTOMATED COUNT: 38.8 FL (ref 35.9–50)
GLOBULIN SER CALC-MCNC: 4.2 G/DL (ref 1.9–3.5)
GLUCOSE BLD-MCNC: 150 MG/DL (ref 65–99)
GLUCOSE SERPL-MCNC: 119 MG/DL (ref 65–99)
HCT VFR BLD AUTO: 37.8 % (ref 37–47)
HGB BLD-MCNC: 12.3 G/DL (ref 12–16)
IMM GRANULOCYTES # BLD AUTO: 0.05 K/UL (ref 0–0.11)
IMM GRANULOCYTES NFR BLD AUTO: 0.4 % (ref 0–0.9)
LIPASE SERPL-CCNC: 29 U/L (ref 11–82)
LYMPHOCYTES # BLD AUTO: 4.17 K/UL (ref 1–4.8)
LYMPHOCYTES NFR BLD: 30.2 % (ref 22–41)
MCH RBC QN AUTO: 26.5 PG (ref 27–33)
MCHC RBC AUTO-ENTMCNC: 32.5 G/DL (ref 33.6–35)
MCV RBC AUTO: 81.3 FL (ref 81.4–97.8)
MONOCYTES # BLD AUTO: 0.63 K/UL (ref 0–0.85)
MONOCYTES NFR BLD AUTO: 4.6 % (ref 0–13.4)
NEUTROPHILS # BLD AUTO: 8.39 K/UL (ref 2–7.15)
NEUTROPHILS NFR BLD: 60.7 % (ref 44–72)
NRBC # BLD AUTO: 0 K/UL
NRBC BLD-RTO: 0 /100 WBC
PLATELET # BLD AUTO: 272 K/UL (ref 164–446)
PMV BLD AUTO: 10.8 FL (ref 9–12.9)
POTASSIUM SERPL-SCNC: 3.7 MMOL/L (ref 3.6–5.5)
PROT SERPL-MCNC: 8.2 G/DL (ref 6–8.2)
RBC # BLD AUTO: 4.65 M/UL (ref 4.2–5.4)
SODIUM SERPL-SCNC: 137 MMOL/L (ref 135–145)
WBC # BLD AUTO: 13.8 K/UL (ref 4.8–10.8)

## 2018-10-01 PROCEDURE — 88304 TISSUE EXAM BY PATHOLOGIST: CPT

## 2018-10-01 PROCEDURE — 80053 COMPREHEN METABOLIC PANEL: CPT

## 2018-10-01 PROCEDURE — 700111 HCHG RX REV CODE 636 W/ 250 OVERRIDE (IP)

## 2018-10-01 PROCEDURE — 85025 COMPLETE CBC W/AUTO DIFF WBC: CPT

## 2018-10-01 PROCEDURE — 96365 THER/PROPH/DIAG IV INF INIT: CPT

## 2018-10-01 PROCEDURE — 83690 ASSAY OF LIPASE: CPT

## 2018-10-01 PROCEDURE — 76705 ECHO EXAM OF ABDOMEN: CPT

## 2018-10-01 PROCEDURE — 700105 HCHG RX REV CODE 258: Performed by: SURGERY

## 2018-10-01 PROCEDURE — 96368 THER/DIAG CONCURRENT INF: CPT

## 2018-10-01 PROCEDURE — 501568 HCHG TROCAR, BLUNTPORT 12MM: Performed by: SURGERY

## 2018-10-01 PROCEDURE — 36415 COLL VENOUS BLD VENIPUNCTURE: CPT

## 2018-10-01 PROCEDURE — 700111 HCHG RX REV CODE 636 W/ 250 OVERRIDE (IP): Performed by: SURGERY

## 2018-10-01 PROCEDURE — A9270 NON-COVERED ITEM OR SERVICE: HCPCS | Performed by: SURGERY

## 2018-10-01 PROCEDURE — 501572 HCHG TROCAR, SHIELD OBTU 5X100: Performed by: SURGERY

## 2018-10-01 PROCEDURE — 160036 HCHG PACU - EA ADDL 30 MINS PHASE I: Performed by: SURGERY

## 2018-10-01 PROCEDURE — 502571 HCHG PACK, LAP CHOLE: Performed by: SURGERY

## 2018-10-01 PROCEDURE — 160039 HCHG SURGERY MINUTES - EA ADDL 1 MIN LEVEL 3: Performed by: SURGERY

## 2018-10-01 PROCEDURE — 160035 HCHG PACU - 1ST 60 MINS PHASE I: Performed by: SURGERY

## 2018-10-01 PROCEDURE — 700112 HCHG RX REV CODE 229: Performed by: SURGERY

## 2018-10-01 PROCEDURE — 700105 HCHG RX REV CODE 258: Performed by: EMERGENCY MEDICINE

## 2018-10-01 PROCEDURE — 501338 HCHG SHEARS, ENDO: Performed by: SURGERY

## 2018-10-01 PROCEDURE — 500800 HCHG LAPAROSCOPIC J/L HOOK: Performed by: SURGERY

## 2018-10-01 PROCEDURE — 160048 HCHG OR STATISTICAL LEVEL 1-5: Performed by: SURGERY

## 2018-10-01 PROCEDURE — 96376 TX/PRO/DX INJ SAME DRUG ADON: CPT

## 2018-10-01 PROCEDURE — 160028 HCHG SURGERY MINUTES - 1ST 30 MINS LEVEL 3: Performed by: SURGERY

## 2018-10-01 PROCEDURE — 700101 HCHG RX REV CODE 250: Performed by: EMERGENCY MEDICINE

## 2018-10-01 PROCEDURE — 700111 HCHG RX REV CODE 636 W/ 250 OVERRIDE (IP): Performed by: EMERGENCY MEDICINE

## 2018-10-01 PROCEDURE — 500002 HCHG ADHESIVE, DERMABOND: Performed by: SURGERY

## 2018-10-01 PROCEDURE — 501838 HCHG SUTURE GENERAL: Performed by: SURGERY

## 2018-10-01 PROCEDURE — G0378 HOSPITAL OBSERVATION PER HR: HCPCS

## 2018-10-01 PROCEDURE — 160002 HCHG RECOVERY MINUTES (STAT): Performed by: SURGERY

## 2018-10-01 PROCEDURE — 700102 HCHG RX REV CODE 250 W/ 637 OVERRIDE(OP)

## 2018-10-01 PROCEDURE — 82962 GLUCOSE BLOOD TEST: CPT

## 2018-10-01 PROCEDURE — 700102 HCHG RX REV CODE 250 W/ 637 OVERRIDE(OP): Performed by: SURGERY

## 2018-10-01 PROCEDURE — 501399 HCHG SPECIMAN BAG, ENDO CATC: Performed by: SURGERY

## 2018-10-01 PROCEDURE — 700101 HCHG RX REV CODE 250

## 2018-10-01 PROCEDURE — 160009 HCHG ANES TIME/MIN: Performed by: SURGERY

## 2018-10-01 PROCEDURE — 501583 HCHG TROCAR, THRD CAN&SEAL 5X100: Performed by: SURGERY

## 2018-10-01 PROCEDURE — 501571 HCHG TROCAR, SEPARATOR 12X100: Performed by: SURGERY

## 2018-10-01 PROCEDURE — A9270 NON-COVERED ITEM OR SERVICE: HCPCS

## 2018-10-01 RX ORDER — OXYCODONE HCL 5 MG/5 ML
SOLUTION, ORAL ORAL
Status: COMPLETED
Start: 2018-10-01 | End: 2018-10-01

## 2018-10-01 RX ORDER — MEPERIDINE HYDROCHLORIDE 50 MG/ML
INJECTION INTRAMUSCULAR; INTRAVENOUS; SUBCUTANEOUS
Status: COMPLETED
Start: 2018-10-01 | End: 2018-10-01

## 2018-10-01 RX ORDER — MEPERIDINE HYDROCHLORIDE 25 MG/ML
25 INJECTION INTRAMUSCULAR; INTRAVENOUS; SUBCUTANEOUS
Status: DISCONTINUED | OUTPATIENT
Start: 2018-10-01 | End: 2018-10-01 | Stop reason: HOSPADM

## 2018-10-01 RX ORDER — ONDANSETRON 2 MG/ML
4 INJECTION INTRAMUSCULAR; INTRAVENOUS ONCE
Status: COMPLETED | OUTPATIENT
Start: 2018-10-01 | End: 2018-10-01

## 2018-10-01 RX ORDER — MIDAZOLAM HYDROCHLORIDE 1 MG/ML
INJECTION INTRAMUSCULAR; INTRAVENOUS
Status: COMPLETED
Start: 2018-10-01 | End: 2018-10-01

## 2018-10-01 RX ORDER — LABETALOL HYDROCHLORIDE 5 MG/ML
5 INJECTION, SOLUTION INTRAVENOUS
Status: DISCONTINUED | OUTPATIENT
Start: 2018-10-01 | End: 2018-10-01 | Stop reason: HOSPADM

## 2018-10-01 RX ORDER — DOCUSATE SODIUM 100 MG/1
100 CAPSULE, LIQUID FILLED ORAL 2 TIMES DAILY
Status: DISCONTINUED | OUTPATIENT
Start: 2018-10-01 | End: 2018-10-02 | Stop reason: HOSPADM

## 2018-10-01 RX ORDER — OXYCODONE HYDROCHLORIDE 5 MG/1
5 TABLET ORAL
Qty: 28 TAB | Refills: 0 | Status: SHIPPED | OUTPATIENT
Start: 2018-10-01 | End: 2018-10-01

## 2018-10-01 RX ORDER — OXYCODONE HCL 5 MG/5 ML
5 SOLUTION, ORAL ORAL
Status: DISCONTINUED | OUTPATIENT
Start: 2018-10-01 | End: 2018-10-01 | Stop reason: HOSPADM

## 2018-10-01 RX ORDER — OXYCODONE HYDROCHLORIDE 10 MG/1
5-15 TABLET ORAL
Status: DISCONTINUED | OUTPATIENT
Start: 2018-10-01 | End: 2018-10-01

## 2018-10-01 RX ORDER — LEVOFLOXACIN 5 MG/ML
750 INJECTION, SOLUTION INTRAVENOUS ONCE
Status: DISCONTINUED | OUTPATIENT
Start: 2018-10-01 | End: 2018-10-01

## 2018-10-01 RX ORDER — ONDANSETRON 2 MG/ML
4 INJECTION INTRAMUSCULAR; INTRAVENOUS
Status: DISCONTINUED | OUTPATIENT
Start: 2018-10-01 | End: 2018-10-01 | Stop reason: HOSPADM

## 2018-10-01 RX ORDER — OMEPRAZOLE 40 MG/1
40 CAPSULE, DELAYED RELEASE ORAL 2 TIMES DAILY
COMMUNITY
End: 2021-11-29

## 2018-10-01 RX ORDER — SODIUM CHLORIDE, SODIUM LACTATE, POTASSIUM CHLORIDE, CALCIUM CHLORIDE 600; 310; 30; 20 MG/100ML; MG/100ML; MG/100ML; MG/100ML
INJECTION, SOLUTION INTRAVENOUS CONTINUOUS
Status: DISCONTINUED | OUTPATIENT
Start: 2018-10-01 | End: 2018-10-02 | Stop reason: HOSPADM

## 2018-10-01 RX ORDER — AMOXICILLIN 250 MG
1 CAPSULE ORAL NIGHTLY
Status: DISCONTINUED | OUTPATIENT
Start: 2018-10-01 | End: 2018-10-02 | Stop reason: HOSPADM

## 2018-10-01 RX ORDER — AMOXICILLIN 250 MG
1 CAPSULE ORAL
Status: DISCONTINUED | OUTPATIENT
Start: 2018-10-01 | End: 2018-10-02 | Stop reason: HOSPADM

## 2018-10-01 RX ORDER — MIDAZOLAM HYDROCHLORIDE 1 MG/ML
1 INJECTION INTRAMUSCULAR; INTRAVENOUS
Status: DISCONTINUED | OUTPATIENT
Start: 2018-10-01 | End: 2018-10-01 | Stop reason: HOSPADM

## 2018-10-01 RX ORDER — ONDANSETRON 2 MG/ML
4 INJECTION INTRAMUSCULAR; INTRAVENOUS EVERY 4 HOURS PRN
Status: DISCONTINUED | OUTPATIENT
Start: 2018-10-01 | End: 2018-10-02 | Stop reason: HOSPADM

## 2018-10-01 RX ORDER — HYDROMORPHONE HYDROCHLORIDE 2 MG/1
2-4 TABLET ORAL
Status: DISCONTINUED | OUTPATIENT
Start: 2018-10-01 | End: 2018-10-02

## 2018-10-01 RX ORDER — OXYCODONE HYDROCHLORIDE 5 MG/1
5 TABLET ORAL
Qty: 28 TAB | Refills: 0 | Status: SHIPPED | OUTPATIENT
Start: 2018-10-01 | End: 2018-10-02

## 2018-10-01 RX ORDER — BISACODYL 10 MG
10 SUPPOSITORY, RECTAL RECTAL
Status: DISCONTINUED | OUTPATIENT
Start: 2018-10-01 | End: 2018-10-02 | Stop reason: HOSPADM

## 2018-10-01 RX ORDER — HYDROMORPHONE HYDROCHLORIDE 4 MG/1
4 TABLET ORAL
Status: DISCONTINUED | OUTPATIENT
Start: 2018-10-01 | End: 2018-10-01

## 2018-10-01 RX ORDER — OXYCODONE HCL 5 MG/5 ML
10 SOLUTION, ORAL ORAL
Status: DISCONTINUED | OUTPATIENT
Start: 2018-10-01 | End: 2018-10-01 | Stop reason: HOSPADM

## 2018-10-01 RX ORDER — BUPIVACAINE HYDROCHLORIDE AND EPINEPHRINE 5; 5 MG/ML; UG/ML
INJECTION, SOLUTION EPIDURAL; INTRACAUDAL; PERINEURAL
Status: DISCONTINUED | OUTPATIENT
Start: 2018-10-01 | End: 2018-10-01 | Stop reason: HOSPADM

## 2018-10-01 RX ORDER — ENEMA 19; 7 G/133ML; G/133ML
1 ENEMA RECTAL
Status: DISCONTINUED | OUTPATIENT
Start: 2018-10-01 | End: 2018-10-02 | Stop reason: HOSPADM

## 2018-10-01 RX ORDER — SODIUM CHLORIDE, SODIUM LACTATE, POTASSIUM CHLORIDE, CALCIUM CHLORIDE 600; 310; 30; 20 MG/100ML; MG/100ML; MG/100ML; MG/100ML
INJECTION, SOLUTION INTRAVENOUS CONTINUOUS
Status: DISCONTINUED | OUTPATIENT
Start: 2018-10-01 | End: 2018-10-01 | Stop reason: HOSPADM

## 2018-10-01 RX ORDER — POLYETHYLENE GLYCOL 3350 17 G/17G
1 POWDER, FOR SOLUTION ORAL 2 TIMES DAILY
Status: DISCONTINUED | OUTPATIENT
Start: 2018-10-01 | End: 2018-10-02 | Stop reason: HOSPADM

## 2018-10-01 RX ADMIN — MEPERIDINE HYDROCHLORIDE 12.5 MG: 50 INJECTION INTRAMUSCULAR; INTRAVENOUS; SUBCUTANEOUS at 09:31

## 2018-10-01 RX ADMIN — MIDAZOLAM HYDROCHLORIDE 1 MG: 1 INJECTION INTRAMUSCULAR; INTRAVENOUS at 09:23

## 2018-10-01 RX ADMIN — FENTANYL CITRATE 50 MCG: 50 INJECTION, SOLUTION INTRAMUSCULAR; INTRAVENOUS at 05:21

## 2018-10-01 RX ADMIN — OXYCODONE HYDROCHLORIDE 5 MG: 10 TABLET ORAL at 12:24

## 2018-10-01 RX ADMIN — CEFTRIAXONE 2 G: 2 INJECTION, POWDER, FOR SOLUTION INTRAMUSCULAR; INTRAVENOUS at 03:00

## 2018-10-01 RX ADMIN — OXYCODONE HYDROCHLORIDE 10 MG: 5 SOLUTION ORAL at 10:45

## 2018-10-01 RX ADMIN — FENTANYL CITRATE 50 MCG: 50 INJECTION, SOLUTION INTRAMUSCULAR; INTRAVENOUS at 20:04

## 2018-10-01 RX ADMIN — MEPERIDINE HYDROCHLORIDE 12.5 MG: 50 INJECTION INTRAMUSCULAR; INTRAVENOUS; SUBCUTANEOUS at 09:30

## 2018-10-01 RX ADMIN — ONDANSETRON HYDROCHLORIDE 4 MG: 2 INJECTION, SOLUTION INTRAMUSCULAR; INTRAVENOUS at 01:00

## 2018-10-01 RX ADMIN — FENTANYL CITRATE 50 MCG: 50 INJECTION, SOLUTION INTRAMUSCULAR; INTRAVENOUS at 17:45

## 2018-10-01 RX ADMIN — SODIUM CHLORIDE, SODIUM LACTATE, POTASSIUM CHLORIDE, CALCIUM CHLORIDE: 600; 310; 30; 20 INJECTION, SOLUTION INTRAVENOUS at 09:45

## 2018-10-01 RX ADMIN — HYDROMORPHONE HYDROCHLORIDE 2 MG: 2 TABLET ORAL at 23:48

## 2018-10-01 RX ADMIN — FENTANYL CITRATE 50 MCG: 50 INJECTION, SOLUTION INTRAMUSCULAR; INTRAVENOUS at 06:25

## 2018-10-01 RX ADMIN — MIDAZOLAM 1 MG: 1 INJECTION INTRAMUSCULAR; INTRAVENOUS at 09:25

## 2018-10-01 RX ADMIN — OXYCODONE HYDROCHLORIDE 15 MG: 10 TABLET ORAL at 16:23

## 2018-10-01 RX ADMIN — SODIUM CHLORIDE, POTASSIUM CHLORIDE, SODIUM LACTATE AND CALCIUM CHLORIDE: 600; 310; 30; 20 INJECTION, SOLUTION INTRAVENOUS at 05:20

## 2018-10-01 RX ADMIN — MIDAZOLAM HYDROCHLORIDE 1 MG: 1 INJECTION INTRAMUSCULAR; INTRAVENOUS at 09:25

## 2018-10-01 RX ADMIN — FENTANYL CITRATE 50 MCG: 50 INJECTION, SOLUTION INTRAMUSCULAR; INTRAVENOUS at 03:00

## 2018-10-01 RX ADMIN — DOCUSATE SODIUM 100 MG: 100 CAPSULE, LIQUID FILLED ORAL at 16:34

## 2018-10-01 RX ADMIN — METRONIDAZOLE 500 MG: 500 INJECTION, SOLUTION INTRAVENOUS at 03:00

## 2018-10-01 ASSESSMENT — PATIENT HEALTH QUESTIONNAIRE - PHQ9
SUM OF ALL RESPONSES TO PHQ9 QUESTIONS 1 AND 2: 0
2. FEELING DOWN, DEPRESSED, IRRITABLE, OR HOPELESS: NOT AT ALL
1. LITTLE INTEREST OR PLEASURE IN DOING THINGS: NOT AT ALL
1. LITTLE INTEREST OR PLEASURE IN DOING THINGS: NOT AT ALL
SUM OF ALL RESPONSES TO PHQ9 QUESTIONS 1 AND 2: 0
2. FEELING DOWN, DEPRESSED, IRRITABLE, OR HOPELESS: NOT AT ALL

## 2018-10-01 ASSESSMENT — PAIN SCALES - GENERAL
PAINLEVEL_OUTOF10: 7
PAINLEVEL_OUTOF10: 6
PAINLEVEL_OUTOF10: 9
PAINLEVEL_OUTOF10: 8
PAINLEVEL_OUTOF10: 4
PAINLEVEL_OUTOF10: 8
PAINLEVEL_OUTOF10: 6
PAINLEVEL_OUTOF10: 10
PAINLEVEL_OUTOF10: 9
PAINLEVEL_OUTOF10: 8
PAINLEVEL_OUTOF10: 4
PAINLEVEL_OUTOF10: 6

## 2018-10-01 ASSESSMENT — COPD QUESTIONNAIRES
DURING THE PAST 4 WEEKS HOW MUCH DID YOU FEEL SHORT OF BREATH: NONE/LITTLE OF THE TIME
HAVE YOU SMOKED AT LEAST 100 CIGARETTES IN YOUR ENTIRE LIFE: NO/DON'T KNOW
COPD SCREENING SCORE: 0
DO YOU EVER COUGH UP ANY MUCUS OR PHLEGM?: NO/ONLY WITH OCCASIONAL COLDS OR INFECTIONS

## 2018-10-01 ASSESSMENT — COGNITIVE AND FUNCTIONAL STATUS - GENERAL
SUGGESTED CMS G CODE MODIFIER DAILY ACTIVITY: CH
DAILY ACTIVITIY SCORE: 24
MOBILITY SCORE: 24
SUGGESTED CMS G CODE MODIFIER MOBILITY: CH

## 2018-10-01 ASSESSMENT — LIFESTYLE VARIABLES
DO YOU DRINK ALCOHOL: NO
EVER_SMOKED: NEVER
ALCOHOL_USE: NO
EVER_SMOKED: NEVER

## 2018-10-01 NOTE — ED PROVIDER NOTES
"ED Provider Note    CHIEF COMPLAINT  Chief Complaint   Patient presents with   • Epigastric Pain     Pt reporting constant \"sharp/ache/dull\" pain for the last 18 hours increasing in severity.    • N/V       HPI  Maame David is a 38 y.o. female who presents to the emergency department chief complaint of 28 hours of epigastric and right upper quadrant pain associated with nausea and vomiting.  Patient states she has had chills but no fevers.  Patient has a recent surgical history of a transabdominal hysterectomy done by Dr. Aguilera on September 4.  She states her bleeding is greatly improved and she has had minimal pain down there and which has been getting better.  She does have a rectocele which  is going to deal with in a few weeks but otherwise has been doing much better.  She states 2 years ago she had similar symptoms of epigastric right upper quadrant pain where she had gallbladder sludge and stones at the time, she is been monitoring her diet avoiding fatty and spicy foods and doing much better but with all the meds from her surgery she states in the last 20 hours she has had pain that she cannot get rid of.  It is sharp and radiates to the back it is worse with any type of trying to eat and associate with nausea vomiting    REVIEW OF SYSTEMS  Positives as above. Pertinent negatives include chest pain shortness of breath blood in her emesis or stool fevers  All other review of systems are negative    PAST MEDICAL HISTORY   has a past medical history of Dental disorder (08/21/2018); Endometriosis; and Urinary incontinence (08/21/2018).    SOCIAL HISTORY  Social History     Social History Main Topics   • Smoking status: Former Smoker     Years: 2.00     Quit date: 1/21/2009   • Smokeless tobacco: Never Used   • Alcohol use Yes      Comment: 1-2 per month   • Drug use: No   • Sexual activity: Yes     Partners: Male       SURGICAL HISTORY   has a past surgical history that includes other orthopedic surgery " "(Left); gyn surgery (2007); vaginal hysterectomy scope total (9/4/2018); salpingectomy (Bilateral, 9/4/2018); anterior and posterior repair (9/4/2018); enterocele repair (9/4/2018); bladder sling female (9/4/2018); and vaginal suspension (9/4/2018).    CURRENT MEDICATIONS  Home Medications    **Home medications have not yet been reviewed for this encounter**         ALLERGIES  Allergies   Allergen Reactions   • Morphine Unspecified     Flushing and felt hot   • Penicillins Anaphylaxis   • Promethazine Hcl Unspecified     \"mini seizures\"       PHYSICAL EXAM  VITAL SIGNS: /100   Pulse 77   Temp 36 °C (96.8 °F)   Resp 18   Ht 1.6 m (5' 3\")   Wt 88 kg (194 lb 0.1 oz)   LMP 03/21/2018 (Approximate) Comment: menapauseal  SpO2 98%   BMI 34.37 kg/m²    Pulse ox interpretation: I interpret this pulse ox as normal.  Constitutional: Alert in no apparent distress.  HENT: Normocephalic atraumatic, MMM  Eyes: PER, Conjunctiva normal, Non-icteric.   Neck: Normal range of motion, No tenderness, Supple, No stridor.   Cardiovascular: Regular rate and rhythm, no murmurs.   Thorax & Lungs: Normal breath sounds, No respiratory distress, No wheezing, No chest tenderness.   Abdomen: Bowel sounds normal,epigastric and RUQ ttp, No pulsatile masses. No peritoneal signs.  Skin: Warm, Dry, No erythema, No rash.   Back: No bony tenderness, No CVA tenderness.   Extremities: Intact distal pulses, No edema, No tenderness, No cyanosis  Musculoskeletal: Good range of motion in all major joints. No tenderness to palpation or major deformities noted.   Neurologic: Alert and oriented x3, No focal deficits noted.        DIFFERENTIAL DIAGNOSIS AND WORK UP PLAN    This is a 38 y.o. female who presents with epigastric pain right upper quadrant pain, patient has a history of gallbladder stones and sludge her pelvic area is nontender with healing wounds she is currently not febrile but uncomfortable appearing on exam.  Differential includes " "gallstone pancreatitis cholecystitis symptomatic cholelithiasis.  Low concern for this being a surgical complication is been almost 30 days from the operation and things are improving.  Patient does not wish for any pain meds at this time would like something for nausea will perform laboratory analysis ultrasound and reassess    DIAGNOSTIC STUDIES / PROCEDURES      LABS  Pertinent Lab Findings  Elevated white blood cell count with a left shift, CMP within normal limits lipase normal      RADIOLOGY  US-RUQ   Final Result         1.  Hepatomegaly, otherwise unremarkable liver and biliary tree ultrasound.   2.  Cholelithiasis and gallbladder sludge with borderline gallbladder wall thickening. Findings equivocal for cholecystitis.           The radiologist's interpretation of all radiological studies have been reviewed by me.      COURSE & MEDICAL DECISION MAKING  Pertinent Labs & Imaging studies reviewed. (See chart for details)    2:37 AM -spoke with Dr. Villanueva regarding the patient's physical examination and history and evidence of acute cholelithiasis.  He is excepted the patient will take her to the operating room.    2:40 AM -reassess patient in the bedside she is now asking for pain management as earlier she has not wanted anything besides an antibiotic.  She is no longer nauseated but still having pain and is  in the epigastrium and right upper quadrant.  She will be started on IV antibiotics for cholecystitis kept n.p.o. and admitted to the hospital for surgery.    /86   Pulse 61   Temp 36 °C (96.8 °F)   Resp 18   Ht 1.6 m (5' 3\")   Wt 88 kg (194 lb 0.1 oz)   LMP 03/21/2018 (Approximate)   SpO2 98%   BMI 34.37 kg/m²       DISPOSITION:  Patient will be admitted to Dr Villanueva in guarded condition.      FINAL IMPRESSION  1. Acute cholecystitis  2. Nausea and vomiting, not intractable        Electronically signed by: Sarahi Borges, 10/1/2018 12:09 AM    This dictation has been created " using voice recognition software and/or scribes. The accuracy of the dictation is limited by the abilities of the software and the expertise of the scribes. I expect there may be some errors of grammar and possibly content. I made every attempt to manually correct the errors within my dictation. However, errors related to voice recognition software and/or scribes may still exist and should be interpreted within the appropriate context.

## 2018-10-01 NOTE — PROGRESS NOTES
2 RN skin assessment performed with RN Teresa.  No skin breakdowns observed.  3 surgical lap sites were noted on abdomen.

## 2018-10-01 NOTE — PROGRESS NOTES
Shift report received from night RN, assumed care at 0715. Patient up in bed, family member at bedside possibly , routinely medicated prn per MAR. AOx4, up self, calls appropriately, bed alarm not in use. PIV assessed and CDI. O2 RA, SPO2 97%. Plan is surgery this am with Dr Villanueva. Discussed POC for multimodal pain management, monitoring VS/labs/I&O, mobility, day time routine, comfort, and safety. Patient has call light and personal belongings within reach. Safety and fall precautions in place. Reviewed current labs, notes, medications, and orders. Hourly rounding in place with RN and CNA.

## 2018-10-01 NOTE — PROGRESS NOTES
Received report that patient report was given to Pre-op and transport was put in. Completed assessment and part of pre-op checklist before patient was taken off floor. Patient family member at bedside went down with transport and patient. One bag taken with him. Patient having 6/10 pain in belly, no nausea. Last oral intake stated was 2000 last pm and last urine was 0530 this am.

## 2018-10-01 NOTE — OP REPORT
DATE OF SERVICE:  10/01/2018    SURGEON:  Adal Villanueva MD    PREOPERATIVE DIAGNOSIS:  Acute cholecystitis.    POSTOPERATIVE DIAGNOSIS:  Acute cholecystitis.    PROCEDURE PERFORMED:  Laparoscopic cholecystectomy.    ANESTHESIA:  General endotracheal anesthesia.    ANESTHESIOLOGIST:  Humberto James MD    INDICATIONS:  A 38-year-old woman with evidence by history, physical,   laboratory data, and imaging of acute cholecystitis.  A cholecystectomy is   indicated.    DESCRIPTION OF PROCEDURE:  Procedure discussed in detail with the patient   including the laparoscopic approach, potential for converting to an open   procedure, associated risk of bleeding, infection, abscess, and hematoma.  I   also discussed the risk of postoperative bile leak, common bile duct   stricture, common duct transection, and the significance of these   complications.  She understood all the above and wished to proceed.  She was   placed under anesthesia by Dr. James.  Abdomen was prepped and draped with   chlorhexidine prep and sterile drapes.  After a timeout, an infraumbilical   incision was made and through this incision, peritoneal cavity was entered   using open Hali entry technique.  Pneumoperitoneum was achieved with CO2   insufflation at pressure of 15 mmHg.  Under direct visualization, a 12 mm   subxiphoid and two 5 mm subcostal ports were placed.  Gallbladder was   identified and noted to be tense, distended, and discolored consistent with   acute cholecystitis.  It could not be grasped.  It was drained with a   percutaneous drainage needle and subsequent to this was grasped and retracted   superiorly and laterally.  Multiple inflammatory adhesions were noted and   these were carefully taken down.  The base of the gallbladder was carefully   dissected.  There was significant inflammation at the base of the gallbladder.    The cystic duct was identified, could be seen to clearly exit the   gallbladder and track laterally along with  the gallbladder.  In similar   fashion, cystic artery was identified and dissected away from surrounding   connective tissue.  A critical view was obtained and subsequent to this, 3   clips were placed proximally in the duct and one distally and was divided   sharply with scissors.  In similar fashion, cystic artery was clipped 3 times   proximally, once distally and divided sharply with scissors.  Gallbladder was   then dissected off the liver bed and placed in a bag retrieval device.  Again,   the gallbladder was noted to be edematous, discolored, and had significant   inflammation consistent with acute cholecystitis.  Hemostasis was assured with   cautery.  Peritoneal cavity was irrigated copiously and irrigation was   removed.  There was no evidence of any bleeding or bile leak.  Ports were   removed.  Pneumoperitoneum was released.  The infraumbilical fascia was   approximated with 0 Vicryl stitches.  Subcutaneous tissue was irrigated and   the skin was approximated with 4-0 Vicryl sutures.  Dermabond was placed.  The   patient tolerated the procedure well without apparent complication.  Final   counts were reported as correct.       ____________________________________     MD YI WALDROP / KAYLA    DD:  10/01/2018 09:08:50  DT:  10/01/2018 11:03:45    D#:  [id number]  Job#:  [id number]

## 2018-10-01 NOTE — PROGRESS NOTES
Pt arrived in unit escorted by transport.  Pt is up-self and denies N/V.  Pt reports pain of 9/10 on mid-epigastric area with referred pain towards middle back.

## 2018-10-01 NOTE — PROGRESS NOTES
38 yof with acute cholecystitis  Plan lap [place]  Discussed risks and benefits   Wishes to proceed

## 2018-10-01 NOTE — OR NURSING
"0914-received pt, from OR,eupneic,V/S WNL.Pt. is grimacing and C/O\"8\"pain level,PRN med's. started/orders.  "

## 2018-10-01 NOTE — PROGRESS NOTES
"Blood pressure 103/59, pulse 64, temperature 36.9 °C (98.4 °F), resp. rate 16, height 1.6 m (5' 3\"), weight 87.6 kg (193 lb 2 oz), last menstrual period 03/21/2018, SpO2 100 %, not currently breastfeeding.      Medically cleared for discharge to home, when tolerating diet, ambulating and pain controlled.   Follow up with Dr. Villanueva one week.    Pt and family counseled, pt does not feel she is able to go home tonight.  Educated pt on she has everything ready is she chooses to go home.  Discharge am if pt does not discharge tonight.     "

## 2018-10-01 NOTE — OR SURGEON
Immediate Post OP Note    PreOp Diagnosis: acute cholecystitis    PostOp Diagnosis: acute cholecystitis    Procedure(s):  MANDI BY LAPAROSCOPY - Wound Class: Clean Contaminated    Surgeon(s):  Adal Villanueva M.D.    Anesthesiologist/Type of Anesthesia:  Anesthesiologist: Humberto James M.D./General    Surgical Staff:  Circulator: Joaquín Manrique R.N.  Operating Room Assistant (ORA): Lamin Juarez  Scrub Person: Summer Gilliam    Specimens removed if any:  ID Type Source Tests Collected by Time Destination   A : gallbladder Tissue Gallbladder PATHOLOGY SPECIMEN Adal Villanueva M.D. 10/1/2018  8:24 AM        Estimated Blood Loss: 100 cc    Findings: acute inflamation    Complications: none        10/1/2018 9:05 AM Adal Villanueva M.D.

## 2018-10-01 NOTE — H&P
HISTORY OF PRESENT ILLNESS:  The patient is a 38-year-old woman who presented   to the emergency room for further evaluation of abdominal pain.  She presented   with fairly severe right upper quadrant pain, which she felt was associated   with some nausea.  The pain is worse with eating and also radiates to her   back.  She has had similar symptoms in the past and had been told previously   that she had symptomatic gallstones, but had not addressed this.  She has not   had any fevers, jaundice, acholic stools, or acute weight changes.    MEDICAL HISTORY:  Significant for reflux.    SURGICAL HISTORY:  She has had a hysterectomy approximately 3 weeks ago, left   shoulder surgery, and endometriosis surgery.    ALLERGIES:  SHE HAS AN ALLERGY TO MORPHINE, PENICILLIN, AND DEMEROL.    SOCIAL HISTORY:  She drinks minimally, does not smoke.    FAMILY HISTORY:  Essentially negative.    REVIEW OF SYSTEMS:  Good health prior to this, negative per AMA and CMS   criteria until the acute onset of abdominal pain over the past, essentially a   day and a half.    PHYSICAL EXAMINATION:  VITAL SIGNS:  Here, she currently has a temperature of 36.2, pulse is 61,   blood pressure 116/78.  GENERAL:  She is lying in bed and is in no distress.  HEENT:  Unremarkable.  Pupils are equal.  Oropharynx without lesions.  NECK:  Supple.  LUNGS:  Clear.  CARDIOVASCULAR:  Reveals regular rate and rhythm.  ABDOMEN:  Soft.  She has mild-to-moderate tenderness in the right upper   quadrant.  No carolyn peritoneal signs.  EXTREMITIES:  Symmetrical, without clubbing, cyanosis or edema.  She has   palpable radial and femoral pulses.  SKIN:  Warm and dry.  NEUROLOGIC:  She is neurologically intact without gross detectable motor or   sensory deficits.    LABORATORY DATA:  Includes a white count of 13.8, hematocrit of 37.8,   platelets of 272.  Her sodium is 137, potassium is 3.7, chloride is 105, CO2   is 21, BUN is 11, creatinine is 0.7.  Her transaminases are  normal.  Her total   bilirubin is 0.4.  She had an ultrasound done around 2 this morning, which   did demonstrate cholelithiasis and gallbladder wall sludge with borderline   gallbladder wall thickening.    IMPRESSION:  A 38-year-old woman with evidence by history, physical,   laboratory data, and imaging of acute cholecystitis.  A cholecystectomy was   indicated.  I have discussed this in detail with her including the   laparoscopic approach, potential for converting to open procedure, associated   risk of bleeding, infection, abscess, and hematoma.  I also discussed the risk   of postoperative bile leak, common bile duct stricture, common duct   transection, and the significance of these complications.  She understood all   of the above and does wish to proceed.  We will make arrangements.       ____________________________________     MD YI WALDROP / NTS    DD:  10/01/2018 08:01:15  DT:  10/01/2018 08:30:17    D#:  [id number]  Job#:  [id number]

## 2018-10-02 VITALS
OXYGEN SATURATION: 95 % | HEIGHT: 63 IN | BODY MASS INDEX: 34.22 KG/M2 | HEART RATE: 97 BPM | TEMPERATURE: 98.4 F | WEIGHT: 193.12 LBS | RESPIRATION RATE: 18 BRPM | SYSTOLIC BLOOD PRESSURE: 113 MMHG | DIASTOLIC BLOOD PRESSURE: 73 MMHG

## 2018-10-02 LAB — GLUCOSE BLD-MCNC: 109 MG/DL (ref 65–99)

## 2018-10-02 PROCEDURE — 700102 HCHG RX REV CODE 250 W/ 637 OVERRIDE(OP): Performed by: NURSE PRACTITIONER

## 2018-10-02 PROCEDURE — 700102 HCHG RX REV CODE 250 W/ 637 OVERRIDE(OP): Performed by: SURGERY

## 2018-10-02 PROCEDURE — A9270 NON-COVERED ITEM OR SERVICE: HCPCS | Performed by: SURGERY

## 2018-10-02 PROCEDURE — G0378 HOSPITAL OBSERVATION PER HR: HCPCS

## 2018-10-02 PROCEDURE — A9270 NON-COVERED ITEM OR SERVICE: HCPCS | Performed by: NURSE PRACTITIONER

## 2018-10-02 PROCEDURE — 82962 GLUCOSE BLOOD TEST: CPT

## 2018-10-02 RX ORDER — HYDROMORPHONE HYDROCHLORIDE 2 MG/1
1 TABLET ORAL EVERY 4 HOURS PRN
Qty: 42 TAB | Refills: 0 | Status: SHIPPED | OUTPATIENT
Start: 2018-10-02 | End: 2018-10-09

## 2018-10-02 RX ORDER — HYDROMORPHONE HYDROCHLORIDE 2 MG/1
1 TABLET ORAL
Status: DISCONTINUED | OUTPATIENT
Start: 2018-10-02 | End: 2018-10-02 | Stop reason: HOSPADM

## 2018-10-02 RX ADMIN — HYDROMORPHONE HYDROCHLORIDE 4 MG: 2 TABLET ORAL at 09:08

## 2018-10-02 RX ADMIN — HYDROMORPHONE HYDROCHLORIDE 2 MG: 2 TABLET ORAL at 05:31

## 2018-10-02 RX ADMIN — POLYETHYLENE GLYCOL 3350 1 PACKET: 17 POWDER, FOR SOLUTION ORAL at 05:31

## 2018-10-02 RX ADMIN — HYDROMORPHONE HYDROCHLORIDE 1 MG: 2 TABLET ORAL at 14:52

## 2018-10-02 ASSESSMENT — PAIN SCALES - GENERAL
PAINLEVEL_OUTOF10: 5
PAINLEVEL_OUTOF10: 0
PAINLEVEL_OUTOF10: 0
PAINLEVEL_OUTOF10: 2

## 2018-10-02 NOTE — ED NOTES
Med Rec complete per Pt at bedside   Ok per Pt to discuss medications with visitor/s present  Allergies Reviewed  No oral ABX in the last 30 days

## 2018-10-02 NOTE — PROGRESS NOTES
"Pt states she \"did not feel well, like I'm going to pass out\". Vitals were taken and stable, within normal limits, blood sugar was 103. Pt also had not passed gas or had a bowel movement yet post op. MD was paged. MD stated to proceed with discharge. Pt instructed to take regular stool softeners once home.  "

## 2018-10-02 NOTE — CARE PLAN
Problem: Safety  Goal: Will remain free from falls    Intervention: Implement fall precautions  Pt was asked to pls use call button for assistance when getting out of bed.       Problem: Pain Management  Goal: Pain level will decrease to patient's comfort goal    Intervention: Follow pain managment plan developed in collaboration with patient and Interdisciplinary Team  Called the PCP to get another type of pain med for the patient because oxycodone was not effective.  Received order for Dilaudid 2-4 mg PO q3 PRN and it is controlling the patient's pain adequately.

## 2018-10-02 NOTE — DISCHARGE INSTRUCTIONS
Discharge Instructions    Discharged to home by car with relative. Discharged via wheelchair, hospital escort: Yes.  Special equipment needed: Not Applicable    Be sure to schedule a follow-up appointment with your primary care doctor or any specialists as instructed.     Discharge Plan:   Diet Plan: Discussed  Activity Level: Discussed  Confirmed Follow up Appointment: Patient to Call and Schedule Appointment  Confirmed Symptoms Management: Discussed  Medication Reconciliation Updated: Yes  Influenza Vaccine Indication: Patient Refuses    I understand that a diet low in cholesterol, fat, and sodium is recommended for good health. Unless I have been given specific instructions below for another diet, I accept this instruction as my diet prescription.   Other diet: regular    Special Instructions: None    · Is patient discharged on Warfarin / Coumadin?   No     Depression / Suicide Risk    As you are discharged from this [facility] facility, it is important to learn how to keep safe from harming yourself.    Recognize the warning signs:  · Abrupt changes in personality, positive or negative- including increase in energy   · Giving away possessions  · Change in eating patterns- significant weight changes-  positive or negative  · Change in sleeping patterns- unable to sleep or sleeping all the time   · Unwillingness or inability to communicate  · Depression  · Unusual sadness, discouragement and loneliness  · Talk of wanting to die  · Neglect of personal appearance   · Rebelliousness- reckless behavior  · Withdrawal from people/activities they love  · Confusion- inability to concentrate     If you or a loved one observes any of these behaviors or has concerns about self-harm, here's what you can do:  · Talk about it- your feelings and reasons for harming yourself  · Remove any means that you might use to hurt yourself (examples: pills, rope, extension cords, firearm)  · Get professional help from the community  (Mental Health, Substance Abuse, psychological counseling)  · Do not be alone:Call your Safe Contact- someone whom you trust who will be there for you.  · Call your local CRISIS HOTLINE 375-1279 or 407-132-2188  · Call your local Children's Mobile Crisis Response Team [place] (719) 343-2691 or www.WellGen  · Call the toll free National Suicide Prevention Hotlines   · National Suicide Prevention Lifeline 036-345-MNMZ (3657)  · National Hope Line Network 800-SUICIDE (080-9838)

## 2018-10-02 NOTE — PROGRESS NOTES
"Reviewed discharge instructions, medications and follow up appointments with patient and . PIV was removed and charted in EPIC. Pain medication was administered. Pt was given prescription for Dilaudid and signed controlled substance form\". All questions answered. PT was taken by hospital transport to lobby to be driven home by .  "

## 2018-10-02 NOTE — PROGRESS NOTES
Patient states that the Dilaudid 2 mg PO PRN is working but she desats without any O2 so she is on 3L via NC.

## 2018-10-02 NOTE — PROGRESS NOTES
"Blood pressure (!) 98/63, pulse 93, temperature 36.8 °C (98.2 °F), resp. rate 18, height 1.6 m (5' 3\"), weight 87.6 kg (193 lb 2 oz), last menstrual period 03/21/2018, SpO2 93 %, not currently breastfeeding.      POD #1 [facility] day #2     Pain issues addressed overnight.   Diluadid 2-4 mg , pt de sats, decreased and pt tolerating well.  RA 95%     Discharge home with family.  "

## 2020-10-26 ENCOUNTER — HOSPITAL ENCOUNTER (OUTPATIENT)
Dept: RADIOLOGY | Facility: MEDICAL CENTER | Age: 40
End: 2020-10-26
Attending: PHYSICIAN ASSISTANT

## 2020-10-26 ENCOUNTER — OFFICE VISIT (OUTPATIENT)
Dept: URGENT CARE | Facility: PHYSICIAN GROUP | Age: 40
End: 2020-10-26

## 2020-10-26 VITALS
HEART RATE: 81 BPM | HEIGHT: 63 IN | DIASTOLIC BLOOD PRESSURE: 92 MMHG | RESPIRATION RATE: 18 BRPM | WEIGHT: 200 LBS | OXYGEN SATURATION: 97 % | BODY MASS INDEX: 35.44 KG/M2 | SYSTOLIC BLOOD PRESSURE: 144 MMHG | TEMPERATURE: 96.6 F

## 2020-10-26 DIAGNOSIS — M25.521 RIGHT ELBOW PAIN: ICD-10-CM

## 2020-10-26 DIAGNOSIS — S43.401A SPRAIN OF RIGHT SHOULDER, UNSPECIFIED SHOULDER SPRAIN TYPE, INITIAL ENCOUNTER: ICD-10-CM

## 2020-10-26 DIAGNOSIS — M25.511 ACUTE PAIN OF RIGHT SHOULDER: ICD-10-CM

## 2020-10-26 DIAGNOSIS — S50.01XA CONTUSION OF RIGHT ELBOW, INITIAL ENCOUNTER: ICD-10-CM

## 2020-10-26 PROCEDURE — 73030 X-RAY EXAM OF SHOULDER: CPT | Mod: RT

## 2020-10-26 PROCEDURE — 73080 X-RAY EXAM OF ELBOW: CPT | Mod: RT

## 2020-10-26 PROCEDURE — 99203 OFFICE O/P NEW LOW 30 MIN: CPT | Performed by: PHYSICIAN ASSISTANT

## 2020-10-26 RX ORDER — IBUPROFEN 800 MG/1
800 TABLET ORAL EVERY 8 HOURS PRN
Qty: 30 TAB | Refills: 0 | Status: SHIPPED | OUTPATIENT
Start: 2020-10-26 | End: 2021-11-29

## 2020-10-27 ASSESSMENT — ENCOUNTER SYMPTOMS
CHILLS: 0
NAUSEA: 0
VOMITING: 0
ABDOMINAL PAIN: 0
DIZZINESS: 0
EYES NEGATIVE: 1
SHORTNESS OF BREATH: 0
COUGH: 0
HEMOPTYSIS: 0
HEADACHES: 0
FEVER: 0
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
## 2021-08-31 ENCOUNTER — HOSPITAL ENCOUNTER (OUTPATIENT)
Facility: MEDICAL CENTER | Age: 41
End: 2021-08-31
Attending: PREVENTIVE MEDICINE
Payer: COMMERCIAL

## 2021-08-31 ENCOUNTER — EMPLOYEE HEALTH (OUTPATIENT)
Dept: OCCUPATIONAL MEDICINE | Facility: CLINIC | Age: 41
End: 2021-08-31

## 2021-08-31 ENCOUNTER — EH NON-PROVIDER (OUTPATIENT)
Dept: OCCUPATIONAL MEDICINE | Facility: CLINIC | Age: 41
End: 2021-08-31

## 2021-08-31 DIAGNOSIS — Z02.89 VISIT FOR OCCUPATIONAL HEALTH EXAMINATION: ICD-10-CM

## 2021-08-31 DIAGNOSIS — Z02.89 VISIT FOR OCCUPATIONAL HEALTH EXAMINATION: Primary | ICD-10-CM

## 2021-08-31 LAB
AMP AMPHETAMINE: NORMAL
BAR BARBITURATES: NORMAL
BZO BENZODIAZEPINES: NORMAL
COC COCAINE: NORMAL
INT CON NEG: NORMAL
INT CON POS: NORMAL
MDMA ECSTASY: NORMAL
MET METHAMPHETAMINES: NORMAL
MTD METHADONE: NORMAL
OPI OPIATES: NORMAL
OXY OXYCODONE: NORMAL
PCP PHENCYCLIDINE: NORMAL
POC URINE DRUG SCREEN OCDRS: NORMAL
THC: NORMAL

## 2021-08-31 PROCEDURE — 86762 RUBELLA ANTIBODY: CPT | Performed by: PREVENTIVE MEDICINE

## 2021-08-31 PROCEDURE — 90746 HEPB VACCINE 3 DOSE ADULT IM: CPT | Performed by: PREVENTIVE MEDICINE

## 2021-08-31 PROCEDURE — 80305 DRUG TEST PRSMV DIR OPT OBS: CPT | Performed by: PREVENTIVE MEDICINE

## 2021-08-31 PROCEDURE — 8915 PR COMPREHENSIVE PHYSICAL: Performed by: PREVENTIVE MEDICINE

## 2021-08-31 PROCEDURE — 90715 TDAP VACCINE 7 YRS/> IM: CPT | Performed by: PREVENTIVE MEDICINE

## 2021-08-31 PROCEDURE — 86735 MUMPS ANTIBODY: CPT | Performed by: PREVENTIVE MEDICINE

## 2021-08-31 PROCEDURE — 86787 VARICELLA-ZOSTER ANTIBODY: CPT | Performed by: PREVENTIVE MEDICINE

## 2021-08-31 PROCEDURE — 94375 RESPIRATORY FLOW VOLUME LOOP: CPT | Performed by: PREVENTIVE MEDICINE

## 2021-08-31 PROCEDURE — 86765 RUBEOLA ANTIBODY: CPT | Performed by: PREVENTIVE MEDICINE

## 2021-08-31 PROCEDURE — 86480 TB TEST CELL IMMUN MEASURE: CPT | Performed by: PREVENTIVE MEDICINE

## 2021-09-01 LAB
GAMMA INTERFERON BACKGROUND BLD IA-ACNC: 0.09 IU/ML
M TB IFN-G BLD-IMP: NEGATIVE
M TB IFN-G CD4+ BCKGRND COR BLD-ACNC: -0.01 IU/ML
MEV IGG SER IA-ACNC: 1.95
MITOGEN IGNF BCKGRD COR BLD-ACNC: >10 IU/ML
MUV IGG SER IA-ACNC: 0.4
QFT TB2 - NIL TBQ2: -0.01 IU/ML
RUBV AB SER QL: 74.3 IU/ML
VZV IGG SER IA-ACNC: 1.5

## 2021-09-14 ENCOUNTER — TELEPHONE (OUTPATIENT)
Dept: OCCUPATIONAL MEDICINE | Facility: CLINIC | Age: 41
End: 2021-09-14

## 2021-10-21 ENCOUNTER — EH NON-PROVIDER (OUTPATIENT)
Dept: OCCUPATIONAL MEDICINE | Facility: CLINIC | Age: 41
End: 2021-10-21

## 2021-10-21 DIAGNOSIS — Z23 ENCOUNTER FOR IMMUNIZATION: ICD-10-CM

## 2021-10-21 PROCEDURE — 90707 MMR VACCINE SC: CPT | Performed by: NURSE PRACTITIONER

## 2021-10-21 NOTE — PROGRESS NOTES
"Pt was seen for vaccines only.    The patient verbally completed a \"Screening checklist for contraindications to vaccines for adults\" form before receiving vaccinations.    1. Are you sick today?  no  2. Do you have allergies to medications, food, a vaccine component, or latex?  no  3. Have you ever had a serious reaction after receiving a vaccination?  no  4. Do you have a long-term health problem with heart disease, lung disease, asthma, kidney disease, metabolic disease (e.g., diabetes), anemia, or other blood disorder?  no  5. Do you have cancer, leukemia, HIV/AIDS, or any other immune system problem?  no  6. In the past 3 months, have you taken medications that affect your immune system,  such as prednisone, other steroids, or anticancer drugs; drugs for the treatment  of rheumatoid arthritis, Crohn’s disease, or psoriasis; or have you had radiation treatments?   no  7. Have you had a seizure or a brain or other nervous system problem?  no  8. During the past year, have you received a transfusion of blood or blood products, or been given immune (gamma) globulin or an antiviral drug? no  9. For women: Are you pregnant or is there a chance you could become pregnant during the next month? no  10. Have you received any vaccinations in the past 4 weeks? no        MMR #1 vaccine administered. VIS given to pt.      Physician consultation not needed today.          "

## 2021-10-22 ENCOUNTER — APPOINTMENT (OUTPATIENT)
Dept: URGENT CARE | Facility: CLINIC | Age: 41
End: 2021-10-22

## 2021-11-03 ENCOUNTER — APPOINTMENT (OUTPATIENT)
Dept: OCCUPATIONAL MEDICINE | Facility: CLINIC | Age: 41
End: 2021-11-03

## 2021-11-24 ENCOUNTER — OFFICE VISIT (OUTPATIENT)
Dept: URGENT CARE | Facility: CLINIC | Age: 41
End: 2021-11-24
Payer: COMMERCIAL

## 2021-11-24 ENCOUNTER — HOSPITAL ENCOUNTER (OUTPATIENT)
Facility: MEDICAL CENTER | Age: 41
End: 2021-11-24
Attending: NURSE PRACTITIONER
Payer: COMMERCIAL

## 2021-11-24 VITALS
OXYGEN SATURATION: 99 % | HEART RATE: 80 BPM | RESPIRATION RATE: 16 BRPM | DIASTOLIC BLOOD PRESSURE: 70 MMHG | SYSTOLIC BLOOD PRESSURE: 120 MMHG | WEIGHT: 190 LBS | HEIGHT: 63 IN | BODY MASS INDEX: 33.66 KG/M2 | TEMPERATURE: 97.6 F

## 2021-11-24 DIAGNOSIS — J98.8 VIRAL RESPIRATORY ILLNESS: ICD-10-CM

## 2021-11-24 DIAGNOSIS — J02.9 PHARYNGITIS, UNSPECIFIED ETIOLOGY: ICD-10-CM

## 2021-11-24 DIAGNOSIS — B97.89 VIRAL RESPIRATORY ILLNESS: ICD-10-CM

## 2021-11-24 DIAGNOSIS — J03.90 TONSILLITIS: ICD-10-CM

## 2021-11-24 DIAGNOSIS — J35.1 TONSILLAR ENLARGEMENT: ICD-10-CM

## 2021-11-24 LAB
EXTERNAL QUALITY CONTROL: NORMAL
FLUAV+FLUBV AG SPEC QL IA: NEGATIVE
INT CON NEG: NEGATIVE
INT CON NEG: NEGATIVE
INT CON POS: POSITIVE
INT CON POS: POSITIVE
S PYO AG THROAT QL: NEGATIVE
SARS-COV+SARS-COV-2 AG RESP QL IA.RAPID: NEGATIVE

## 2021-11-24 PROCEDURE — 99213 OFFICE O/P EST LOW 20 MIN: CPT | Performed by: NURSE PRACTITIONER

## 2021-11-24 PROCEDURE — 87426 SARSCOV CORONAVIRUS AG IA: CPT | Performed by: NURSE PRACTITIONER

## 2021-11-24 PROCEDURE — 87880 STREP A ASSAY W/OPTIC: CPT | Performed by: NURSE PRACTITIONER

## 2021-11-24 PROCEDURE — U0005 INFEC AGEN DETEC AMPLI PROBE: HCPCS

## 2021-11-24 PROCEDURE — 87804 INFLUENZA ASSAY W/OPTIC: CPT | Performed by: NURSE PRACTITIONER

## 2021-11-24 PROCEDURE — U0003 INFECTIOUS AGENT DETECTION BY NUCLEIC ACID (DNA OR RNA); SEVERE ACUTE RESPIRATORY SYNDROME CORONAVIRUS 2 (SARS-COV-2) (CORONAVIRUS DISEASE [COVID-19]), AMPLIFIED PROBE TECHNIQUE, MAKING USE OF HIGH THROUGHPUT TECHNOLOGIES AS DESCRIBED BY CMS-2020-01-R: HCPCS

## 2021-11-24 PROCEDURE — 87070 CULTURE OTHR SPECIMN AEROBIC: CPT

## 2021-11-24 RX ORDER — PHENTERMINE HYDROCHLORIDE 37.5 MG/1
37.5 CAPSULE ORAL EVERY MORNING
COMMUNITY
End: 2021-12-29

## 2021-11-24 ASSESSMENT — ENCOUNTER SYMPTOMS
SHORTNESS OF BREATH: 1
HEMOPTYSIS: 0
SORE THROAT: 1
CHILLS: 1
WHEEZING: 0
COUGH: 1
HEADACHES: 1
FEVER: 1

## 2021-11-24 NOTE — PATIENT INSTRUCTIONS
Symptomatic care.  -Oral hydration and rest.   -Cough control: nonpharmacologic options for cough relief such as throat lozenges, hot tea, honey.  -Over the counter expectorant as directed; Guaifenesin (Mucinex).  -Tylenol or ibuprofen for pain and fever as directed.     Seek emergency medical care immediately for: Trouble breathing, persistent pain or pressure in the chest, confusion, inability to wake or stay awake, bluish lips or face, persistent tachycardia (fast heart rate), prolonged dizziness, persistent high grade fevers. Follow up for prolonged cough, persistent wheezing, leg swelling, or any other concerns. Follow up with PCP.      Viral Respiratory Infection  A respiratory infection is an illness that affects part of the respiratory system, such as the lungs, nose, or throat. A respiratory infection that is caused by a virus is called a viral respiratory infection.  Common types of viral respiratory infections include:  · A cold.  · The flu (influenza).  · A respiratory syncytial virus (RSV) infection.  What are the causes?  This condition is caused by a virus.  What are the signs or symptoms?  Symptoms of this condition include:  · A stuffy or runny nose.  · Yellow or green nasal discharge.  · A cough.  · Sneezing.  · Fatigue.  · Achy muscles.  · A sore throat.  · Sweating or chills.  · A fever.  · A headache.  How is this diagnosed?  This condition may be diagnosed based on:  · Your symptoms.  · A physical exam.  · Testing of nasal swabs.  How is this treated?  This condition may be treated with medicines, such as:  · Antiviral medicine. This may shorten the length of time a person has symptoms.  · Expectorants. These make it easier to cough up mucus.  · Decongestant nasal sprays.  · Acetaminophen or NSAIDs to relieve fever and pain.  Antibiotic medicines are not prescribed for viral infections. This is because antibiotics are designed to kill bacteria. They are not effective against viruses.  Follow  these instructions at home:    Managing pain and congestion  · Take over-the-counter and prescription medicines only as told by your health care provider.  · If you have a sore throat, gargle with a salt-water mixture 3-4 times a day or as needed. To make a salt-water mixture, completely dissolve ½-1 tsp of salt in 1 cup of warm water.  · Use nose drops made from salt water to ease congestion and soften raw skin around your nose.  · Drink enough fluid to keep your urine pale yellow. This helps prevent dehydration and helps loosen up mucus.  General instructions  · Rest as much as possible.  · Do not drink alcohol.  · Do not use any products that contain nicotine or tobacco, such as cigarettes and e-cigarettes. If you need help quitting, ask your health care provider.  · Keep all follow-up visits as told by your health care provider. This is important.  How is this prevented?    · Get an annual flu shot. You may get the flu shot in late summer, fall, or winter. Ask your health care provider when you should get your flu shot.  · Avoid exposing others to your respiratory infection.  ? Stay home from work or school as told by your health care provider.  ? Wash your hands with soap and water often, especially after you cough or sneeze. If soap and water are not available, use alcohol-based hand .  · Avoid contact with people who are sick during cold and flu season. This is generally fall and winter.  Contact a health care provider if:  · Your symptoms last for 10 days or longer.  · Your symptoms get worse over time.  · You have a fever.  · You have severe sinus pain in your face or forehead.  · The glands in your jaw or neck become very swollen.  Get help right away if you:  · Feel pain or pressure in your chest.  · Have shortness of breath.  · Faint or feel like you will faint.  · Have severe and persistent vomiting.  · Feel confused or disoriented.  Summary  · A respiratory infection is an illness that affects  part of the respiratory system, such as the lungs, nose, or throat. A respiratory infection that is caused by a virus is called a viral respiratory infection.  · Common types of viral respiratory infections are a cold, influenza, and respiratory syncytial virus (RSV) infection.  · Symptoms of this condition include a stuffy or runny nose, cough, sneezing, fatigue, achy muscles, sore throat, and fevers or chills.  · Antibiotic medicines are not prescribed for viral infections. This is because antibiotics are designed to kill bacteria. They are not effective against viruses.  This information is not intended to replace advice given to you by your health care provider. Make sure you discuss any questions you have with your health care provider.  Document Released: 09/27/2006 Document Revised: 12/26/2019 Document Reviewed: 01/28/2019  [employer] Patient Education © 2020 [employer]      COVID-19  COVID-19 is a respiratory infection that is caused by a virus called severe acute respiratory syndrome coronavirus 2 (SARS-CoV-2). The disease is also known as coronavirus disease or novel coronavirus. In some people, the virus may not cause any symptoms. In others, it may cause a serious infection. The infection can get worse quickly and can lead to complications, such as:  · Pneumonia, or infection of the lungs.  · Acute respiratory distress syndrome or ARDS. This is fluid build-up in the lungs.  · Acute respiratory failure. This is a condition in which there is not enough oxygen passing from the lungs to the body.  · Sepsis or septic shock. This is a serious bodily reaction to an infection.  · Blood clotting problems.  · Secondary infections due to bacteria or fungus.  The virus that causes COVID-19 is contagious. This means that it can spread from person to person through droplets from coughs and sneezes (respiratory secretions).  What are the causes?  This illness is caused by a virus. You may catch the virus  by:  · Breathing in droplets from an infected person's cough or sneeze.  · Touching something, like a table or a doorknob, that was exposed to the virus (contaminated) and then touching your mouth, nose, or eyes.  What increases the risk?  Risk for infection  You are more likely to be infected with this virus if you:  · Live in or travel to an area with a COVID-19 outbreak.  · Come in contact with a sick person who recently traveled to an area with a COVID-19 outbreak.  · Provide care for or live with a person who is infected with COVID-19.  Risk for serious illness  You are more likely to become seriously ill from the virus if you:  · Are 65 years of age or older.  · Have a long-term disease that lowers your body's ability to fight infection (immunocompromised).  · Live in a nursing home or long-term care facility.  · Have a long-term (chronic) disease such as:  ? Chronic lung disease, including chronic obstructive pulmonary disease or asthma  ? Heart disease.  ? Diabetes.  ? Chronic kidney disease.  ? Liver disease.  · Are obese.  What are the signs or symptoms?  Symptoms of this condition can range from mild to severe. Symptoms may appear any time from 2 to 14 days after being exposed to the virus. They include:  · A fever.  · A cough.  · Difficulty breathing.  · Chills.  · Muscle pains.  · A sore throat.  · Loss of taste or smell.  Some people may also have stomach problems, such as nausea, vomiting, or diarrhea.  Other people may not have any symptoms of COVID-19.  How is this diagnosed?  This condition may be diagnosed based on:  · Your signs and symptoms, especially if:  ? You live in an area with a COVID-19 outbreak.  ? You recently traveled to or from an area where the virus is common.  ? You provide care for or live with a person who was diagnosed with COVID-19.  · A physical exam.  · Lab tests, which may include:  ? A nasal swab to take a sample of fluid from your nose.  ? A throat swab to take a sample  of fluid from your throat.  ? A sample of mucus from your lungs (sputum).  ? Blood tests.  · Imaging tests, which may include, X-rays, CT scan, or ultrasound.  How is this treated?  At present, there is no medicine to treat COVID-19. Medicines that treat other diseases are being used on a trial basis to see if they are effective against COVID-19.  Your health care provider will talk with you about ways to treat your symptoms. For most people, the infection is mild and can be managed at home with rest, fluids, and over-the-counter medicines.  Treatment for a serious infection usually takes places in a hospital intensive care unit (ICU). It may include one or more of the following treatments. These treatments are given until your symptoms improve.  · Receiving fluids and medicines through an IV.  · Supplemental oxygen. Extra oxygen is given through a tube in the nose, a face mask, or a redd.  · Positioning you to lie on your stomach (prone position). This makes it easier for oxygen to get into the lungs.  · Continuous positive airway pressure (CPAP) or bi-level positive airway pressure (BPAP) machine. This treatment uses mild air pressure to keep the airways open. A tube that is connected to a motor delivers oxygen to the body.  · Ventilator. This treatment moves air into and out of the lungs by using a tube that is placed in your windpipe.  · Tracheostomy. This is a procedure to create a hole in the neck so that a breathing tube can be inserted.  · Extracorporeal membrane oxygenation (ECMO). This procedure gives the lungs a chance to recover by taking over the functions of the heart and lungs. It supplies oxygen to the body and removes carbon dioxide.  Follow these instructions at home:  Lifestyle  · If you are sick, stay home except to get medical care. Your health care provider will tell you how long to stay home. Call your health care provider before you go for medical care.  · Rest at home as told by your health  care provider.  · Do not use any products that contain nicotine or tobacco, such as cigarettes, e-cigarettes, and chewing tobacco. If you need help quitting, ask your health care provider.  · Return to your normal activities as told by your health care provider. Ask your health care provider what activities are safe for you.  General instructions  · Take over-the-counter and prescription medicines only as told by your health care provider.  · Drink enough fluid to keep your urine pale yellow.  · Keep all follow-up visits as told by your health care provider. This is important.  How is this prevented?    There is no vaccine to help prevent COVID-19 infection. However, there are steps you can take to protect yourself and others from this virus.  To protect yourself:   · Do not travel to areas where COVID-19 is a risk. The areas where COVID-19 is reported change often. To identify high-risk areas and travel restrictions, check the [facility] travel website: wwwnc.cdc.gov/travel/notices  · If you live in, or must travel to, an area where COVID-19 is a risk, take precautions to avoid infection.  ? Stay away from people who are sick.  ? Wash your hands often with soap and water for 20 seconds. If soap and water are not available, use an alcohol-based hand .  ? Avoid touching your mouth, face, eyes, or nose.  ? Avoid going out in public, follow guidance from your state and local health authorities.  ? If you must go out in public, wear a cloth face covering or face mask.  ? Disinfect objects and surfaces that are frequently touched every day. This may include:  § Counters and tables.  § Doorknobs and light switches.  § Sinks and faucets.  § Electronics, such as phones, remote controls, keyboards, computers, and tablets.  To protect others:  If you have symptoms of COVID-19, take steps to prevent the virus from spreading to others.  · If you think you have a COVID-19 infection, contact your health care provider right  away. Tell your health care team that you think you may have a COVID-19 infection.  · Stay home. Leave your house only to seek medical care. Do not use public transport.  · Do not travel while you are sick.  · Wash your hands often with soap and water for 20 seconds. If soap and water are not available, use alcohol-based hand .  · Stay away from other members of your household. Let healthy household members care for children and pets, if possible. If you have to care for children or pets, wash your hands often and wear a mask. If possible, stay in your own room, separate from others. Use a different bathroom.  · Make sure that all people in your household wash their hands well and often.  · Cough or sneeze into a tissue or your sleeve or elbow. Do not cough or sneeze into your hand or into the air.  · Wear a cloth face covering or face mask.  Where to find more information  · Centers for Disease Control and Prevention: www.cdc.gov/coronavirus/2019-ncov/index.html  · World Health Organization: www.who.int/health-topics/coronavirus  Contact a health care provider if:  · You live in or have traveled to an area where COVID-19 is a risk and you have symptoms of the infection.  · You have had contact with someone who has COVID-19 and you have symptoms of the infection.  Get help right away if:  · You have trouble breathing.  · You have pain or pressure in your chest.  · You have confusion.  · You have bluish lips and fingernails.  · You have difficulty waking from sleep.  · You have symptoms that get worse.  These symptoms may represent a serious problem that is an emergency. Do not wait to see if the symptoms will go away. Get medical help right away. Call your local emergency services (911 in the [place] Do not drive yourself to the hospital. Let the emergency medical personnel know if you think you have COVID-19.  Summary  · COVID-19 is a respiratory infection that is caused by a virus. It is also known as  coronavirus disease or novel coronavirus. It can cause serious infections, such as pneumonia, acute respiratory distress syndrome, acute respiratory failure, or sepsis.  · The virus that causes COVID-19 is contagious. This means that it can spread from person to person through droplets from coughs and sneezes.  · You are more likely to develop a serious illness if you are 65 years of age or older, have a weak immunity, live in a nursing home, or have chronic disease.  · There is no medicine to treat COVID-19. Your health care provider will talk with you about ways to treat your symptoms.  · Take steps to protect yourself and others from infection. Wash your hands often and disinfect objects and surfaces that are frequently touched every day. Stay away from people who are sick and wear a mask if you are sick.  This information is not intended to replace advice given to you by your health care provider. Make sure you discuss any questions you have with your health care provider.  Document Released: 01/23/2020 Document Revised: 05/14/2020 Document Reviewed: 01/23/2020  [employer] Patient Education © 2020 [employer]      Tonsillitis    Tonsillitis is an infection of the throat that causes the tonsils to become red, tender, and swollen. Tonsils are tissues in the back of your throat. Each tonsil has crevices (crypts). Tonsils normally work to protect the body from infection.  What are the causes?  Sudden (acute) tonsillitis may be caused by a virus or bacteria, including streptococcal bacteria. Long-lasting (chronic) tonsillitis occurs when the crypts of the tonsils become filled with pieces of food and bacteria, which makes it easy for the tonsils to become repeatedly infected.  Tonsillitis can be spread from person to person (is contagious). It may be spread by inhaling droplets that are released with coughing or sneezing. You may also come into contact with viruses or bacteria on surfaces, such as cups or  utensils.  What are the signs or symptoms?  Symptoms of this condition include:  · A sore throat. This may include trouble swallowing.  · White patches on the tonsils.  · Swollen tonsils.  · Fever.  · Headache.  · Tiredness.  · Loss of appetite.  · Snoring during sleep when you did not snore before.  · Small, foul-smelling, yellowish-white pieces of material (tonsilloliths) that you occasionally cough up or spit out. These can cause you to have bad breath.  How is this diagnosed?  This condition is diagnosed with a physical exam. Diagnosis can be confirmed with the results of lab tests, including a throat culture.  How is this treated?  Treatment for this condition depends on the cause, but usually focuses on treating the symptoms associated with it. Treatment may include:  · Medicines to relieve pain and manage fever.  · Steroid medicines to reduce swelling.  · Antibiotic medicines if the condition is caused by bacteria.  If attacks of tonsillitis are severe and frequent, your health care provider may recommend surgery to remove the tonsils (tonsillectomy).  Follow these instructions at home:  Medicines  · Take over-the-counter and prescription medicines only as told by your health care provider.  · If you were prescribed an antibiotic medicine, take it as told by your health care provider. Do not stop taking the antibiotic even if you start to feel better.  Eating and drinking  · Drink enough fluid to keep your urine clear or pale yellow.  · While your throat is sore, eat soft or liquid foods, such as sherbet, soups, or instant breakfast drinks.  · Drink warm liquids.  · Eat frozen ice pops.  General instructions  · Rest as much as possible and get plenty of sleep.  · Gargle with a salt-water mixture 3-4 times a day or as needed. To make a salt-water mixture, completely dissolve ½-1 tsp of salt in 1 cup of warm water.  · Wash your hands regularly with soap and water. If soap and water are not available, use hand  .  · Do not share cups, bottles, or other utensils until your symptoms have gone away.  · Do not smoke. This can help your symptoms and prevent the infection from coming back. If you need help quitting, ask your health care provider.  · Keep all follow-up visits as told by your health care provider. This is important.  Contact a health care provider if:  · You notice large, tender lumps in your neck that were not there before.  · You have a fever that does not go away after 2-3 days.  · You develop a rash.  · You cough up a green, yellow-brown, or bloody substance.  · You cannot swallow liquids or food for 24 hours.  · Only one of your tonsils is swollen.  Get help right away if:  · You develop any new symptoms, such as vomiting, severe headache, stiff neck, chest pain, trouble breathing, or trouble swallowing.  · You have severe throat pain along with drooling or voice changes.  · You have severe pain that is not controlled with medicines.  · You cannot fully open your mouth.  · You develop redness, swelling, or severe pain anywhere in your neck.  Summary  · Tonsillitis is an infection of the throat that causes the tonsils to become red, tender, and swollen.  · Tonsillitis may be caused by a virus or bacteria.  · Rest as much as possible. Get plenty of sleep.  This information is not intended to replace advice given to you by your health care provider. Make sure you discuss any questions you have with your health care provider.  Document Released: 09/27/2006 Document Revised: 11/30/2018 Document Reviewed: 01/23/2018  [employer] Patient Education © 2020 [employer]

## 2021-11-24 NOTE — PROGRESS NOTES
Subjective:     Maame David is a 41 y.o. female who presents for Pharyngitis (Sore throat, headache, chills, cough, SOB, bodyaches x 2 days)      Enlarged tonsils. Sore throat 1/10. Has strep exposure. Tightness in chest.  No hx of COVID. Is Vaccinated.    Cough  This is a new problem. The current episode started yesterday. The problem has been gradually worsening. The cough is productive of sputum. Associated symptoms include chills, ear pain, a fever, headaches, nasal congestion, a sore throat and shortness of breath. Pertinent negatives include no hemoptysis or wheezing. Nothing aggravates the symptoms. Treatments tried: Tyenol and motrin. Day/Ny quil.       Past Medical History:   Diagnosis Date   • Dental disorder 08/21/2018    upper and lower   • Endometriosis    • Urinary incontinence 08/21/2018    occasionally       Past Surgical History:   Procedure Laterality Date   • MANDI BY LAPAROSCOPY N/A 10/1/2018    Procedure: MANDI BY LAPAROSCOPY;  Surgeon: Adal Villanueva M.D.;  Location: SURGERY [facility];  Service: General   • VAGINAL HYSTERECTOMY SCOPE TOTAL  9/4/2018    Procedure: VAGINAL HYSTERECTOMY SCOPE TOTAL;  Surgeon: Atul Aguilera M.D.;  Location: SURGERY SAME DAY [facility];  Service: Gynecology   • SALPINGECTOMY Bilateral 9/4/2018    Procedure: SALPINGECTOMY;  Surgeon: Atul Aguilera M.D.;  Location: SURGERY SAME DAY [facility];  Service: Gynecology   • ANTERIOR AND POSTERIOR REPAIR  9/4/2018    Procedure: ANTERIOR AND POSTERIOR REPAIR - PERINEOPLASTY;  Surgeon: Atul Aguilera M.D.;  Location: SURGERY SAME DAY [facility];  Service: Gynecology   • ENTEROCELE REPAIR  9/4/2018    Procedure: ENTEROCELE REPAIR;  Surgeon: Atul Aguilera M.D.;  Location: SURGERY SAME DAY [facility];  Service: Gynecology   • BLADDER SLING FEMALE  9/4/2018    Procedure: BLADDER SLING FEMALE - TOT;  Surgeon: Atul Aguilera M.D.;  Location: SURGERY SAME DAY [facility];  Service: Gynecology   •  VAGINAL SUSPENSION  2018    Procedure: VAGINAL SUSPENSION - SACROSPINOUS VAULT;  Surgeon: Atul Aguilera M.D.;  Location: SURGERY SAME DAY [facility];  Service: Gynecology   • GYN SURGERY  2007    ablation   • OTHER ORTHOPEDIC SURGERY Left     shoulder       Social History     Socioeconomic History   • Marital status: Single     Spouse name: Not on file   • Number of children: Not on file   • Years of education: Not on file   • Highest education level: Not on file   Occupational History   • Not on file   Tobacco Use   • Smoking status: Former Smoker     Years: 2.00     Quit date: 2009     Years since quittin.8   • Smokeless tobacco: Never Used   Vaping Use   • Vaping Use: Never used   Substance and Sexual Activity   • Alcohol use: Yes     Comment: 1-2 per month   • Drug use: No   • Sexual activity: Yes     Partners: Male   Other Topics Concern   • Not on file   Social History Narrative   • Not on file     Social Determinants of Health     Financial Resource Strain:    • Difficulty of Paying Living Expenses: Not on file   Food Insecurity:    • Worried About Running Out of Food in the Last Year: Not on file   • Ran Out of Food in the Last Year: Not on file   Transportation Needs:    • Lack of Transportation (Medical): Not on file   • Lack of Transportation (Non-Medical): Not on file   Physical Activity:    • Days of Exercise per Week: Not on file   • Minutes of Exercise per Session: Not on file   Stress:    • Feeling of Stress : Not on file   Social Connections:    • Frequency of Communication with Friends and Family: Not on file   • Frequency of Social Gatherings with Friends and Family: Not on file   • Attends Muslim Services: Not on file   • Active Member of Clubs or Organizations: Not on file   • Attends Club or Organization Meetings: Not on file   • Marital Status: Not on file   Intimate Partner Violence:    • Fear of Current or Ex-Partner: Not on file   • Emotionally Abused: Not on file   •  "Physically Abused: Not on file   • Sexually Abused: Not on file   Housing Stability:    • Unable to Pay for Housing in the Last Year: Not on file   • Number of Places Lived in the Last Year: Not on file   • Unstable Housing in the Last Year: Not on file        Family History   Problem Relation Age of Onset   • Hypertension Mother    • Alcohol/Drug Father    • Diabetes Father    • Diabetes Paternal Grandmother    • Diabetes Paternal Grandfather         Allergies   Allergen Reactions   • Morphine Unspecified     Flushing and felt hot   • Penicillins Anaphylaxis     Stopped breathing when 9 yo  Tolerates Keflex, Tolerates Ceftriaxone   • Promethazine Hcl Unspecified     \"mini seizures\"       Review of Systems   Constitutional: Positive for chills, fever and malaise/fatigue.   HENT: Positive for congestion, ear pain and sore throat.    Respiratory: Positive for cough and shortness of breath. Negative for hemoptysis and wheezing.    Neurological: Positive for headaches.   All other systems reviewed and are negative.       Objective:   /70 (BP Location: Right arm, Patient Position: Sitting, BP Cuff Size: Adult)   Pulse 80   Temp 36.4 °C (97.6 °F) (Temporal)   Resp 16   Ht 1.6 m (5' 3\")   Wt 86.2 kg (190 lb)   LMP 03/21/2018 (Approximate)   SpO2 99%   BMI 33.66 kg/m²     Physical Exam  Vitals reviewed.   Constitutional:       General: She is not in acute distress.     Appearance: She is well-developed. She is ill-appearing. She is not toxic-appearing.   HENT:      Head: Normocephalic and atraumatic.      Right Ear: External ear normal. A middle ear effusion is present. Tympanic membrane is not erythematous.      Left Ear: External ear normal. A middle ear effusion is present. Tympanic membrane is not erythematous.      Nose: Congestion present.      Mouth/Throat:      Lips: Pink.      Mouth: Mucous membranes are moist.      Pharynx: Posterior oropharyngeal erythema present.      Tonsils: No tonsillar exudate " or tonsillar abscesses. 2+ on the right. 2+ on the left.   Eyes:      Conjunctiva/sclera: Conjunctivae normal.   Cardiovascular:      Rate and Rhythm: Normal rate.   Pulmonary:      Effort: Pulmonary effort is normal. No respiratory distress.      Breath sounds: No stridor. No decreased breath sounds, wheezing, rhonchi or rales.      Comments: Cough noted.   Musculoskeletal:         General: Normal range of motion.      Cervical back: Normal range of motion and neck supple.   Lymphadenopathy:      Head:      Right side of head: Tonsillar adenopathy present.      Left side of head: Tonsillar adenopathy present.   Skin:     General: Skin is warm and dry.      Findings: No rash.   Neurological:      General: No focal deficit present.      Mental Status: She is alert and oriented to person, place, and time.      GCS: GCS eye subscore is 4. GCS verbal subscore is 5. GCS motor subscore is 6.   Psychiatric:         Mood and Affect: Mood normal.         Speech: Speech normal.         Behavior: Behavior normal.         Thought Content: Thought content normal.         Judgment: Judgment normal.         Assessment/Plan:   1. Viral respiratory illness  - POCT Influenza A/B  - POCT SARS-COV Antigen AMANDA (Symptomatic Only)  - SARS-CoV-2 PCR (24 hour In-House): Collect NP swab in VTM; Future    2. Pharyngitis, unspecified etiology  - POCT Rapid Strep A  - POCT SARS-COV Antigen AMANDA (Symptomatic Only)  - SARS-CoV-2 PCR (24 hour In-House): Collect NP swab in VTM; Future  - CULTURE THROAT; Future    3. Tonsillitis  - CULTURE THROAT; Future  Results for orders placed or performed in visit on 11/24/21   POCT Influenza A/B   Result Value Ref Range    Rapid Influenza A-B negative     Internal Control Positive Positive     Internal Control Negative Negative    POCT Rapid Strep A   Result Value Ref Range    Rapid Strep Screen negative     Internal Control Positive Positive     Internal Control Negative Negative    POCT SARS-COV Antigen AMANDA  (Symptomatic Only)   Result Value Ref Range    Internal  Valid     SARS-COV ANTIGEN AMANDA Negative    Symptomatic care.  -Oral hydration and rest.   -Cough control: nonpharmacologic options for cough relief such as throat lozenges, hot tea, honey.  -Over the counter expectorant as directed; Guaifenesin (Mucinex).  -Tylenol or ibuprofen for pain and fever as directed.   - Sudafed    Seek emergency medical care immediately for: Trouble breathing, persistent pain or pressure in the chest, confusion, inability to wake or stay awake, bluish lips or face, persistent tachycardia (fast heart rate), prolonged dizziness, persistent high grade fevers. Follow up for prolonged cough, persistent wheezing, leg swelling, or any other concerns. Follow up with PCP.     Discussed viral etiology of cough. COVID S&S, and self isolation guidelines. S&S of PNA with follow up.     Differential diagnosis, natural history, supportive care, and indications for immediate follow-up discussed.

## 2021-11-25 LAB
COVID ORDER STATUS COVID19: NORMAL
SARS-COV-2 RNA RESP QL NAA+PROBE: NOTDETECTED
SPECIMEN SOURCE: NORMAL

## 2021-11-27 LAB
BACTERIA SPEC RESP CULT: NORMAL
SIGNIFICANT IND 70042: NORMAL
SITE SITE: NORMAL
SOURCE SOURCE: NORMAL

## 2021-11-29 ENCOUNTER — HOSPITAL ENCOUNTER (OUTPATIENT)
Facility: MEDICAL CENTER | Age: 41
End: 2021-11-29
Attending: PREVENTIVE MEDICINE
Payer: COMMERCIAL

## 2021-11-29 ENCOUNTER — EH NON-PROVIDER (OUTPATIENT)
Dept: OCCUPATIONAL MEDICINE | Facility: CLINIC | Age: 41
End: 2021-11-29

## 2021-11-29 DIAGNOSIS — Z11.59 ENCOUNTER FOR SCREENING FOR OTHER VIRAL DISEASES: ICD-10-CM

## 2021-11-29 LAB — COVID ORDER STATUS COVID19: NORMAL

## 2021-11-29 PROCEDURE — U0003 INFECTIOUS AGENT DETECTION BY NUCLEIC ACID (DNA OR RNA); SEVERE ACUTE RESPIRATORY SYNDROME CORONAVIRUS 2 (SARS-COV-2) (CORONAVIRUS DISEASE [COVID-19]), AMPLIFIED PROBE TECHNIQUE, MAKING USE OF HIGH THROUGHPUT TECHNOLOGIES AS DESCRIBED BY CMS-2020-01-R: HCPCS | Performed by: PREVENTIVE MEDICINE

## 2021-11-30 ENCOUNTER — TELEPHONE (OUTPATIENT)
Dept: URGENT CARE | Facility: CLINIC | Age: 41
End: 2021-11-30

## 2021-11-30 LAB
SARS-COV-2 RNA RESP QL NAA+PROBE: NOTDETECTED
SPECIMEN SOURCE: NORMAL

## 2021-11-30 NOTE — TELEPHONE ENCOUNTER
Pt requesting inhaler, cough syrup (that won't cause drowsiness) antibx if possible, and a strep order.

## 2021-12-03 ENCOUNTER — APPOINTMENT (OUTPATIENT)
Dept: RADIOLOGY | Facility: IMAGING CENTER | Age: 41
End: 2021-12-03
Attending: NURSE PRACTITIONER
Payer: COMMERCIAL

## 2021-12-03 DIAGNOSIS — R05.9 COUGH: ICD-10-CM

## 2021-12-03 PROCEDURE — 71046 X-RAY EXAM CHEST 2 VIEWS: CPT | Mod: TC | Performed by: NURSE PRACTITIONER

## 2021-12-28 ENCOUNTER — HOSPITAL ENCOUNTER (EMERGENCY)
Facility: MEDICAL CENTER | Age: 41
End: 2021-12-28
Payer: COMMERCIAL

## 2021-12-28 VITALS
SYSTOLIC BLOOD PRESSURE: 127 MMHG | HEART RATE: 107 BPM | BODY MASS INDEX: 33.98 KG/M2 | RESPIRATION RATE: 18 BRPM | OXYGEN SATURATION: 99 % | WEIGHT: 191.8 LBS | DIASTOLIC BLOOD PRESSURE: 82 MMHG | HEIGHT: 63 IN | TEMPERATURE: 97.8 F

## 2021-12-28 PROCEDURE — 302449 STATCHG TRIAGE ONLY (STATISTIC)

## 2021-12-28 NOTE — ED TRIAGE NOTES
"Chief Complaint   Patient presents with   • Flu Like Symptoms     body aches, difficulty breathing, HA, sore throat       40 yo female to triage for above complaint. Patient reports worsening symptoms since Sunday night.    Pt is alert and oriented, speaking in full sentences, follows commands and responds appropriately to questions.     Patient placed in senior lounge and educated on triage process. Asked to inform RN of any changes.    /82   Pulse (!) 107   Temp 36.6 °C (97.8 °F) (Temporal)   Resp 18   Ht 1.6 m (5' 3\")   Wt 87 kg (191 lb 12.8 oz)   LMP 03/21/2018 (Approximate)   SpO2 99%   BMI 33.98 kg/m²     "

## 2021-12-29 ENCOUNTER — OFFICE VISIT (OUTPATIENT)
Dept: URGENT CARE | Facility: PHYSICIAN GROUP | Age: 41
End: 2021-12-29
Payer: COMMERCIAL

## 2021-12-29 ENCOUNTER — HOSPITAL ENCOUNTER (OUTPATIENT)
Dept: RADIOLOGY | Facility: MEDICAL CENTER | Age: 41
End: 2021-12-29
Attending: FAMILY MEDICINE
Payer: COMMERCIAL

## 2021-12-29 VITALS
WEIGHT: 198 LBS | BODY MASS INDEX: 35.08 KG/M2 | RESPIRATION RATE: 18 BRPM | HEART RATE: 83 BPM | TEMPERATURE: 97.3 F | DIASTOLIC BLOOD PRESSURE: 78 MMHG | HEIGHT: 63 IN | SYSTOLIC BLOOD PRESSURE: 122 MMHG | OXYGEN SATURATION: 97 %

## 2021-12-29 DIAGNOSIS — R05.9 COUGH: ICD-10-CM

## 2021-12-29 DIAGNOSIS — R06.02 SOB (SHORTNESS OF BREATH): ICD-10-CM

## 2021-12-29 DIAGNOSIS — U07.1 COVID-19: ICD-10-CM

## 2021-12-29 DIAGNOSIS — Z20.822 EXPOSURE TO COVID-19 VIRUS: ICD-10-CM

## 2021-12-29 DIAGNOSIS — M79.10 MYALGIA: ICD-10-CM

## 2021-12-29 LAB
EXTERNAL QUALITY CONTROL: NORMAL
FLUAV+FLUBV AG SPEC QL IA: NEGATIVE
INT CON NEG: NORMAL
INT CON POS: NORMAL
SARS-COV+SARS-COV-2 AG RESP QL IA.RAPID: POSITIVE

## 2021-12-29 PROCEDURE — 87426 SARSCOV CORONAVIRUS AG IA: CPT | Performed by: FAMILY MEDICINE

## 2021-12-29 PROCEDURE — 71046 X-RAY EXAM CHEST 2 VIEWS: CPT

## 2021-12-29 PROCEDURE — 87804 INFLUENZA ASSAY W/OPTIC: CPT | Performed by: FAMILY MEDICINE

## 2021-12-29 PROCEDURE — 99214 OFFICE O/P EST MOD 30 MIN: CPT | Mod: CS | Performed by: FAMILY MEDICINE

## 2021-12-29 ASSESSMENT — ENCOUNTER SYMPTOMS
VOMITING: 0
MYALGIAS: 0
WEIGHT LOSS: 0
EYE DISCHARGE: 0
EYE REDNESS: 0
NAUSEA: 0

## 2021-12-29 NOTE — PROGRESS NOTES
"Subjective     Maame David is a 41 y.o. female who presents with Cough (lungs hurts, hurts to breath, diarrhea, sob, headache, x3 days. )            Onset 12/26 dry cough, myalgia, HA. SOB. Chills. Intermittent tachycardia. C19 exposure. C19 vaccinated without prior infection. No loss of taste or smell. No other aggravating or alleviating factors.        Review of Systems   Constitutional: Negative for malaise/fatigue and weight loss.   Eyes: Negative for discharge and redness.   Gastrointestinal: Negative for nausea and vomiting.   Musculoskeletal: Negative for joint pain and myalgias.   Skin: Negative for itching and rash.              Objective     /78   Pulse 83   Temp 36.3 °C (97.3 °F) (Temporal)   Resp 18   Ht 1.6 m (5' 3\")   Wt 89.8 kg (198 lb)   LMP 03/21/2018 (Approximate)   SpO2 97%   BMI 35.07 kg/m²      Physical Exam  Constitutional:       General: She is not in acute distress.     Appearance: She is well-developed.   HENT:      Head: Normocephalic and atraumatic.      Right Ear: Tympanic membrane normal.      Left Ear: Tympanic membrane normal.      Nose: No congestion.      Mouth/Throat:      Mouth: Mucous membranes are moist.      Pharynx: No posterior oropharyngeal erythema.   Eyes:      Conjunctiva/sclera: Conjunctivae normal.   Cardiovascular:      Rate and Rhythm: Normal rate and regular rhythm.      Heart sounds: Normal heart sounds. No murmur heard.      Pulmonary:      Effort: Pulmonary effort is normal.      Breath sounds: Normal breath sounds. No wheezing.   Skin:     General: Skin is warm and dry.      Findings: No rash.   Neurological:      Mental Status: She is alert and oriented to person, place, and time.                             Assessment & Plan         Rapid covid +  Influenza negative    CXR: no acute cardiopulmonary process per radiology    1. Cough  POCT SARS-COV Antigen AMANDA (Symptomatic Only)    POCT Influenza A/B    DX-CHEST-2 VIEWS    Hydrocod Polst-CPM " Polst ER (TUSSIONEX) 10-8 MG/5ML Suspension Extended Release   2. SOB (shortness of breath)  POCT SARS-COV Antigen AMANDA (Symptomatic Only)    POCT Influenza A/B    DX-CHEST-2 VIEWS   3. Myalgia  POCT SARS-COV Antigen AMANDA (Symptomatic Only)    POCT Influenza A/B   4. Exposure to COVID-19 virus  POCT SARS-COV Antigen AMANDA (Symptomatic Only)   5. COVID-19       Differential diagnosis, natural history, supportive care, and indications for immediate follow-up discussed at length.

## 2021-12-30 RX ORDER — KETOROLAC TROMETHAMINE 30 MG/ML
30 INJECTION, SOLUTION INTRAMUSCULAR; INTRAVENOUS ONCE
OUTPATIENT
Start: 2021-12-30 | End: 2022-01-02

## 2022-03-03 ENCOUNTER — EH NON-PROVIDER (OUTPATIENT)
Dept: OCCUPATIONAL MEDICINE | Facility: CLINIC | Age: 42
End: 2022-03-03
Payer: COMMERCIAL

## 2022-03-03 DIAGNOSIS — Z23 ENCOUNTER FOR IMMUNIZATION: ICD-10-CM

## 2022-03-03 PROCEDURE — 90746 HEPB VACCINE 3 DOSE ADULT IM: CPT | Performed by: NURSE PRACTITIONER

## 2022-03-03 NOTE — PROGRESS NOTES
"Pt was seen for vaccines only.    The patient verbally completed a \"Screening checklist for contraindications to vaccines for adults\" form before receiving vaccinations.    1. Are you sick today?  no  2. Do you have allergies to medications, food, a vaccine component, or latex?  no  3. Have you ever had a serious reaction after receiving a vaccination?  no  4. Do you have a long-term health problem with heart, lung, kidney, or metabolic disease (e.g., diabetes), asthma, a blood disorder, no spleen, complement component deficiency, cochlear implant, or spinal fluid leak? Are you on long-term Asprin therapy  no  5. Do you have cancer, leukemia, HIV/AIDS, or any other immune system problem?  no  6. Do you have a parent, brother, or sister, with an immune system disorder? no    7.In the past 3 months, have you taken medications that affect your immune system,  such as prednisone, other steroids, or anticancer drugs; drugs for the treatment of rheumatoid arthritis, Crohn’s disease, or psoriasis; or have you had radiation treatments?   no  8. Have you had a seizure or a brain or other nervous system problem?  no  9. During the past year, have you received a transfusion of blood or blood products, or been given immune (gamma) globulin or an antiviral drug? no  10. For women: Are you pregnant or is there a chance you could become pregnant during the next month? no  11. Have you received any vaccinations in the past 4 weeks? no        Hep B #3 vaccine administered. VIS given to pt.      Physician consultation not needed today.          "

## 2022-05-01 ENCOUNTER — OFFICE VISIT (OUTPATIENT)
Dept: URGENT CARE | Facility: CLINIC | Age: 42
End: 2022-05-01
Payer: COMMERCIAL

## 2022-05-01 ENCOUNTER — HOSPITAL ENCOUNTER (OUTPATIENT)
Facility: MEDICAL CENTER | Age: 42
End: 2022-05-01
Attending: PREVENTIVE MEDICINE
Payer: COMMERCIAL

## 2022-05-01 ENCOUNTER — APPOINTMENT (OUTPATIENT)
Dept: RADIOLOGY | Facility: IMAGING CENTER | Age: 42
End: 2022-05-01
Attending: NURSE PRACTITIONER
Payer: COMMERCIAL

## 2022-05-01 VITALS
DIASTOLIC BLOOD PRESSURE: 82 MMHG | OXYGEN SATURATION: 96 % | HEART RATE: 95 BPM | HEIGHT: 63 IN | SYSTOLIC BLOOD PRESSURE: 122 MMHG | BODY MASS INDEX: 34.2 KG/M2 | WEIGHT: 193 LBS | TEMPERATURE: 97.6 F | RESPIRATION RATE: 18 BRPM

## 2022-05-01 DIAGNOSIS — R06.02 SHORTNESS OF BREATH: ICD-10-CM

## 2022-05-01 DIAGNOSIS — R05.9 COUGH: ICD-10-CM

## 2022-05-01 DIAGNOSIS — B37.9 ANTIBIOTIC-INDUCED YEAST INFECTION: ICD-10-CM

## 2022-05-01 DIAGNOSIS — T36.95XA ANTIBIOTIC-INDUCED YEAST INFECTION: ICD-10-CM

## 2022-05-01 DIAGNOSIS — J30.81 ALLERGIC RHINITIS DUE TO ANIMAL HAIR AND DANDER: ICD-10-CM

## 2022-05-01 DIAGNOSIS — R06.2 WHEEZING: ICD-10-CM

## 2022-05-01 DIAGNOSIS — J40 BRONCHITIS WITH ACUTE WHEEZING: ICD-10-CM

## 2022-05-01 LAB — COVID ORDER STATUS COVID19: NORMAL

## 2022-05-01 PROCEDURE — 71046 X-RAY EXAM CHEST 2 VIEWS: CPT | Mod: TC | Performed by: PHYSICIAN ASSISTANT

## 2022-05-01 PROCEDURE — 99214 OFFICE O/P EST MOD 30 MIN: CPT | Mod: 25 | Performed by: NURSE PRACTITIONER

## 2022-05-01 PROCEDURE — 94640 AIRWAY INHALATION TREATMENT: CPT | Performed by: NURSE PRACTITIONER

## 2022-05-01 RX ORDER — FLUCONAZOLE 150 MG/1
150 TABLET ORAL DAILY
Qty: 1 TABLET | Refills: 0 | Status: SHIPPED | OUTPATIENT
Start: 2022-05-01 | End: 2022-05-02

## 2022-05-01 RX ORDER — DOXYCYCLINE HYCLATE 100 MG
100 TABLET ORAL 2 TIMES DAILY
Qty: 14 TABLET | Refills: 0 | Status: SHIPPED | OUTPATIENT
Start: 2022-05-01 | End: 2022-05-08

## 2022-05-01 RX ORDER — IPRATROPIUM BROMIDE AND ALBUTEROL SULFATE 2.5; .5 MG/3ML; MG/3ML
3 SOLUTION RESPIRATORY (INHALATION) ONCE
Status: COMPLETED | OUTPATIENT
Start: 2022-05-01 | End: 2022-05-01

## 2022-05-01 RX ADMIN — IPRATROPIUM BROMIDE AND ALBUTEROL SULFATE 3 ML: 2.5; .5 SOLUTION RESPIRATORY (INHALATION) at 10:58

## 2022-05-01 NOTE — PROGRESS NOTES
"Subjective:   Maame David is a 42 y.o. female who presents for Asthma (SOB, tightness )       HPI  Patient presents for evaluation of 7 to 8-day history of shortness of breath, cough, wheezing, and chest tightness.  Patient states that she has allergic rhinitis as well as allergy to cats, has been exposed to cats recently by her neighbor.  Patient has tried over-the-counter cough and cold medication as well as decongestants without any relief.  Patient states her son able to sleep last night due to difficulty breathing, needing to sit upright.  Patient has taken 2 rapid COVID test in 2 influenza test, both have been negative.  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    ROS  All other systems are negative except as documented above within HPI.    MEDS:   Current Outpatient Medications:   •  doxycycline (VIBRAMYCIN) 100 MG Tab, Take 1 Tablet by mouth 2 times a day for 7 days., Disp: 14 Tablet, Rfl: 0  •  fluconazole (DIFLUCAN) 150 MG tablet, Take 1 Tablet by mouth every day for 1 dose., Disp: 1 Tablet, Rfl: 0  ALLERGIES:   Allergies   Allergen Reactions   • Morphine Unspecified     Flushing and felt hot   • Penicillins Anaphylaxis     Stopped breathing when 7 yo  Tolerates Keflex, Tolerates Ceftriaxone   • Promethazine Hcl Unspecified     \"mini seizures\"       Patient's PMH, SocHx, SurgHx, FamHx, Drug allergies and medications were reviewed.     Objective:   /82   Pulse 95   Temp 36.4 °C (97.6 °F)   Resp 18   Ht 1.6 m (5' 3\")   Wt 87.5 kg (193 lb)   LMP 03/21/2018 (Approximate)   SpO2 96%   BMI 34.19 kg/m²     Physical Exam  Vitals and nursing note reviewed.   Constitutional:       General: She is awake.      Appearance: Normal appearance. She is well-developed and normal weight.   HENT:      Head: Normocephalic and atraumatic.      Right Ear: Tympanic membrane, ear canal and external ear normal.      Left Ear: Tympanic membrane, ear canal and external ear normal.      Nose: Nose normal.      " Mouth/Throat:      Lips: Pink.      Mouth: Mucous membranes are moist.      Pharynx: Oropharynx is clear. Uvula midline.   Eyes:      Extraocular Movements: Extraocular movements intact.      Conjunctiva/sclera: Conjunctivae normal.      Pupils: Pupils are equal, round, and reactive to light.   Neck:      Thyroid: No thyromegaly.      Trachea: Trachea normal.   Cardiovascular:      Rate and Rhythm: Normal rate and regular rhythm.      Pulses: Normal pulses.      Heart sounds: Normal heart sounds, S1 normal and S2 normal.   Pulmonary:      Effort: Pulmonary effort is normal. No respiratory distress.      Breath sounds: Decreased air movement present. Examination of the right-upper field reveals wheezing. Examination of the left-upper field reveals wheezing. Examination of the right-middle field reveals wheezing. Examination of the right-lower field reveals wheezing. Examination of the left-lower field reveals wheezing. Wheezing present. No rhonchi or rales.   Abdominal:      General: Bowel sounds are normal.      Palpations: Abdomen is soft.   Musculoskeletal:         General: Normal range of motion.      Cervical back: Full passive range of motion without pain, normal range of motion and neck supple.   Lymphadenopathy:      Cervical: No cervical adenopathy.   Skin:     General: Skin is warm and dry.      Capillary Refill: Capillary refill takes less than 2 seconds.   Neurological:      General: No focal deficit present.      Mental Status: She is alert and oriented to person, place, and time.      Gait: Gait is intact.   Psychiatric:         Attention and Perception: Attention and perception normal.         Mood and Affect: Mood normal.         Speech: Speech normal.         Behavior: Behavior normal. Behavior is cooperative.         Thought Content: Thought content normal.         Judgment: Judgment normal.         Assessment/Plan:   Assessment    1. Bronchitis with acute wheezing  - doxycycline (VIBRAMYCIN) 100 MG  Tab; Take 1 Tablet by mouth 2 times a day for 7 days.  Dispense: 14 Tablet; Refill: 0    2. Allergic rhinitis due to animal hair and dander    3. Antibiotic-induced yeast infection  - fluconazole (DIFLUCAN) 150 MG tablet; Take 1 Tablet by mouth every day for 1 dose.  Dispense: 1 Tablet; Refill: 0    4. Wheezing  - DX-CHEST-2 VIEWS; Future  - ipratropium-albuterol (DUONEB) nebulizer solution    5. Cough  - DX-CHEST-2 VIEWS; Future  - ipratropium-albuterol (DUONEB) nebulizer solution    6. Shortness of breath  - DX-CHEST-2 VIEWS; Future  - ipratropium-albuterol (DUONEB) nebulizer solution      Vital signs stable at today's acute urgent care visit. Reviewed test results that were completed in the clinic, chest x-ray is within normal limits.  DuoNeb given in clinic, patient noted significant relief and improvement of cough. Begin medications as listed.  She has albuterol, Tessalon Perles and Xyzal at home that she will take as directed.  Discussed management options as indicated.  Will obtain PCR COVID testing, however she has already met the 5-day quarantine guidelines per CDC.    Advised the patient to follow-up with the primary care provider for recheck, reevaluation, and/or consideration of further management. Return to urgent care with any worsening/continued symptoms.  Red flags discussed and indications to immediately call 911 or present to the ED.  All questions were encouraged and answered to the patient's satisfaction and understanding, and they agree to the plan of care.     I personally reviewed prior external notes and test results pertinent to today's visit.  I have independently reviewed and interpreted all diagnostics ordered during this urgent care acute visit. Time spent evaluating this patient was a minimum of 30 minutes and includes preparing for visit, counseling/education, exam, evaluation, obtaining history, and ordering lab/test/procedures.      Please note that this dictation was created using  voice recognition software. I have made a reasonable attempt to correct obvious errors, but I expect that there are errors of grammar and possibly content that I did not discover before finalizing the note.

## 2022-05-02 ENCOUNTER — HOSPITAL ENCOUNTER (OUTPATIENT)
Dept: RADIOLOGY | Facility: MEDICAL CENTER | Age: 42
End: 2022-05-02
Attending: NURSE PRACTITIONER
Payer: COMMERCIAL

## 2022-05-02 DIAGNOSIS — Z12.31 VISIT FOR SCREENING MAMMOGRAM: ICD-10-CM

## 2022-05-02 LAB
SARS-COV-2 RNA RESP QL NAA+PROBE: NOTDETECTED
SPECIMEN SOURCE: NORMAL

## 2022-05-02 PROCEDURE — 77063 BREAST TOMOSYNTHESIS BI: CPT

## 2022-05-09 ENCOUNTER — GYNECOLOGY VISIT (OUTPATIENT)
Dept: OBGYN | Facility: CLINIC | Age: 42
End: 2022-05-09
Payer: COMMERCIAL

## 2022-05-09 VITALS
DIASTOLIC BLOOD PRESSURE: 79 MMHG | SYSTOLIC BLOOD PRESSURE: 139 MMHG | BODY MASS INDEX: 34.91 KG/M2 | HEART RATE: 79 BPM | WEIGHT: 197 LBS | HEIGHT: 63 IN

## 2022-05-09 DIAGNOSIS — R19.8 ABNORMAL DEFECATION: ICD-10-CM

## 2022-05-09 DIAGNOSIS — N39.46 URINARY INCONTINENCE, MIXED: Primary | ICD-10-CM

## 2022-05-09 DIAGNOSIS — G89.29 CHRONIC FEMALE PELVIC PAIN: ICD-10-CM

## 2022-05-09 DIAGNOSIS — R30.0 DYSURIA: ICD-10-CM

## 2022-05-09 DIAGNOSIS — N39.42 URINARY INCONTINENCE WITHOUT SENSORY AWARENESS: ICD-10-CM

## 2022-05-09 DIAGNOSIS — N94.10 DYSPAREUNIA IN FEMALE: ICD-10-CM

## 2022-05-09 DIAGNOSIS — R10.2 CHRONIC FEMALE PELVIC PAIN: ICD-10-CM

## 2022-05-09 DIAGNOSIS — Z98.890 HISTORY OF SUBURETHRAL SLING PROCEDURE: ICD-10-CM

## 2022-05-09 LAB
APPEARANCE UR: NORMAL
BILIRUB UR STRIP-MCNC: NORMAL MG/DL
COLOR UR AUTO: YELLOW
GLUCOSE UR STRIP.AUTO-MCNC: NEGATIVE MG/DL
KETONES UR STRIP.AUTO-MCNC: NEGATIVE MG/DL
LEUKOCYTE ESTERASE UR QL STRIP.AUTO: NEGATIVE
NITRITE UR QL STRIP.AUTO: NEGATIVE
PH UR STRIP.AUTO: 5.5 [PH] (ref 5–8)
PROT UR QL STRIP: NEGATIVE MG/DL
RBC UR QL AUTO: NEGATIVE
SP GR UR STRIP.AUTO: >=1.03
UROBILINOGEN UR STRIP-MCNC: NORMAL MG/DL

## 2022-05-09 PROCEDURE — 99204 OFFICE O/P NEW MOD 45 MIN: CPT | Performed by: STUDENT IN AN ORGANIZED HEALTH CARE EDUCATION/TRAINING PROGRAM

## 2022-05-09 PROCEDURE — 81002 URINALYSIS NONAUTO W/O SCOPE: CPT | Performed by: STUDENT IN AN ORGANIZED HEALTH CARE EDUCATION/TRAINING PROGRAM

## 2022-05-09 NOTE — NON-PROVIDER
PT here today for consult   PT States vaginal pain and urinary incontiencne   Hysterectomy? 2018  Good #: [phone] (home)   PVR : N/A  Pharmacy Verified

## 2022-05-09 NOTE — PROGRESS NOTES
"Urogynecology and Pelvic Reconstructive Surgery Consultation Visit    Maame David MRN:[id number] :1980    Referred by: Nguyen    Reason for Visit:   Chief Complaint   Patient presents with   • New Patient     Consult          Subjective     History of Presenting Illness:    Ms.Jonna Harika David is a 42 y.o. year old P2 who presents for incontinence and dyspareunia since prior pelvic surgery.      She underwent hysterectomy in 2018 with Dr. Aguilera for large fibriod uterus and bleeding. She denies prolaspe symptoms or urinary incontinence pre-operatively but underwent complex prolapse repair including SSLF and sling procedure. This is when her pain started and urinary symptoms worsened.   She has had persistent bladder/urethral pain, as well as pain with intercourse. . She was diagnosed with a rectocele immediately after surgery by a different doctor.     Dyspareunia is described as focused on the inner left side, worst about 1-1.5 inches insiside. She never had pain with sex before surgery. She can now only have sex in a few positions and with significant discomfort (unable to have sex at all for 6 months after surgery). She has pain with most orgasms     She also reports new urinary incontinence, both with cough, laugh, but also without sensation and \"out the blue\".     She also feels a protrusion at the top/front of the vagina, which is worse with orgasm.     She also has to lean back to defecate - she can't normally empty since surgery        Prior Pelvic surgery:   18: LAVH/BS, anterior repair, posterior repair, perineorrhaphy, SSLF, enterocele repair, transobturator sling (Dr. Aguilera) op reports reviewed    Prior treatment:   None for pain     Fluid intake:   2 cups water  1 cup coffee  1 cup tea    Pelvic floor symptom review:     Bladder:   Voids per day: 6-8 Voids per night: 0     Urinary incontinence episodes per day: 4-6    Urge leakage:  On Movement to Bathroom and Full Bladder   Stress leakage: " With Cough, With Laugh and With Exercise   Continuous / insensible urine loss: Yes   Nocturnal enuresis: No    Leakage volume: Drops   Number of pads/day: 4-6    Bladder emptying: Incomplete   Voiding symptoms: Post-Void Dribble   UTI in last 12 months: No   Other urologic history: none      Prolapse:     Prolapse symptoms: Possible protrusion noted        Bowel:    Constipation: No    Straining to empty bowels: Yes - has to lean back to empty   Splinting to evacuate: No    Painful evacuation: No    Difficulty emptying rectum: Yes   Incontinence to stool: No   Incontinence to gas: No     Blood in stool: No       Sexual function:    Sexually active: Yes   Gender of partners: Male   Pain with intercourse: as above         Past medical and surgical history    Past obstetric history   Number of vaginal deliveries: 2   Number of  deliveries: 0   History of vacuum/forceps: No    History of obstetric anal sphincter injury: No     Past gynecological history:    Last menstrual period: Patient's last menstrual period was 2018 (approximate). s/p hyst       Past medical history:  Past Medical History:   Diagnosis Date   • Dental disorder 2018    upper and lower   • Urinary incontinence 2018    occasionally   • Endometriosis      Past surgical history:  Past Surgical History:   Procedure Laterality Date   • MANDI BY LAPAROSCOPY N/A 10/1/2018    Procedure: MANDI BY LAPAROSCOPY;  Surgeon: Adal Villanueva M.D.;  Location: SURGERY [facility];  Service: General   • VAGINAL HYSTERECTOMY SCOPE TOTAL  2018    Procedure: VAGINAL HYSTERECTOMY SCOPE TOTAL;  Surgeon: Atul Aguilera M.D.;  Location: SURGERY SAME DAY [facility];  Service: Gynecology   • SALPINGECTOMY Bilateral 2018    Procedure: SALPINGECTOMY;  Surgeon: Atul Aguilera M.D.;  Location: SURGERY SAME DAY [facility];  Service: Gynecology   • ANTERIOR AND POSTERIOR REPAIR  2018    Procedure: ANTERIOR AND POSTERIOR REPAIR -  "PERINEOPLASTY;  Surgeon: Atul Aguilera M.D.;  Location: SURGERY SAME DAY [facility];  Service: Gynecology   • ENTEROCELE REPAIR  9/4/2018    Procedure: ENTEROCELE REPAIR;  Surgeon: Atul Aguilera M.D.;  Location: SURGERY SAME DAY [facility];  Service: Gynecology   • BLADDER SLING FEMALE  9/4/2018    Procedure: BLADDER SLING FEMALE - TOT;  Surgeon: Atul Aguilera M.D.;  Location: SURGERY SAME DAY [facility];  Service: Gynecology   • VAGINAL SUSPENSION  9/4/2018    Procedure: VAGINAL SUSPENSION - SACROSPINOUS VAULT;  Surgeon: Atul Aguilera M.D.;  Location: SURGERY SAME DAY [facility];  Service: Gynecology   • GYN SURGERY  2007    ablation   • OTHER ORTHOPEDIC SURGERY Left     shoulder     Medications:currently has no medications in their medication list.  Allergies:Morphine, Penicillins, and Promethazine hcl  Family history:  Family History   Problem Relation Age of Onset   • Hypertension Mother    • Alcohol/Drug Father    • Diabetes Father    • Diabetes Paternal Grandmother    • Diabetes Paternal Grandfather      Social history: reports that she quit smoking about 13 years ago. She quit after 2.00 years of use. She has never used smokeless tobacco. She reports current alcohol use. She reports that she does not use drugs.    Review of systems: A full review of systems was performed, and negative with the exception of want is noted above in the HPI.        Objective        /79   Pulse 79   Ht 1.6 m (5' 3\")   Wt 89.4 kg (197 lb)   LMP 03/21/2018 (Approximate)   BMI 34.90 kg/m²     Physical Exam  Vitals reviewed. Exam conducted with a chaperone present (MA - see notes.).   Constitutional:       Appearance: Normal appearance.   HENT:      Head: Normocephalic.      Mouth/Throat:      Mouth: Mucous membranes are moist.   Cardiovascular:      Rate and Rhythm: Normal rate.   Pulmonary:      Effort: Pulmonary effort is normal.   Abdominal:      Palpations: Abdomen is soft. There is no mass.      " Tenderness: There is no abdominal tenderness.   Skin:     General: Skin is warm and dry.   Neurological:      Mental Status: She is alert.   Psychiatric:         Mood and Affect: Mood normal.         Genitourinary:    External female genitalia: WNL   Vulva: WNL   Bulbocavernosus reflex: Intact   Anal wink reflex: Intact   Perineal sensation: WNL   Urethra: Not hypermobile    Vagina: Vaginal band vs muscular spasm/hypertrophy felt on patient's left, approx 4cm proximal to hymen - this is area of peak tenderness   Atrophy: None   Cough stress test: Negative    Pelvic floor:   POP-Q: no significant prolapse on exam   Urethral tenderness: yes - palpation of sling causes pain which radiates to groin bilaterally   Bladder/ suprapubic tenderness: Yes   Levator tenderness: Bilateral   Levator muscle tone: Hypertonic   Pelvic floor contraction strength (modified Oxford scale): 2=Weak   Pelvic floor contraction duration: Brief    Bimanual exam: No Palpable Adnexal Masses   Vaginal band/stricture: Yes    Procedure Performed: No    Diagnostic test and records review:    Urine dipstick: neg     Radiology: n/a    Documentation reviewed: Prior EMR Records             Assessment & Plan     Ms.Jonna Harika David is a 42 y.o. year old P2 with dyspareunia, pelvic pain, incontinence s/p prior hysterectomy and pelvic reconstructive surgery. We discussed my recommendations for further diagnosis and treatment at length today.     1. Urinary incontinence, mixed  2. Urinary incontinence without sensory awareness  3. History of suburethral sling procedure  4. Dysuria  Ms. David reports symptoms of mixed stress and urge urinary incontinence, worsened since sling procedure. She was educated on the pathophysiology of bladder urgency, and that her symptoms are likely due to overactivity of the bladder muscle and nerves. The pathogenesis of CHICO is related to weakness in the pelvic structures includes genetic tendency, aging, menopause and  childbirth injuries. I discussed options for management which include both nonsurgical and surgical options.   - Given complex nature in symptoms, I would not seek any treatment until urodynamic testing is performed, to evaluate the causes for her leakage (urge vs stress  - She requires cystourethroscopy evaluation to rule out mesh exposure into the bladder.urethra as a cause for her symptoms  - I recommend maximizing pelvic floor physical therapy to release muscle tension at site of vaginal band (referral to [facility])  - She may require sling release/resection for pain, which may affect incontinence.       5. Dyspareunia in female  6. Chronic female pelvic pain  Pain is directly related (temporally) to prior pelvic reconstructive surgery, which involved a/p repair and SSLF with ethibond. She has significant vaginal banding on examination, and pain radiating to the groin with sling palpation.    - As above, maximize physical therapy   - Next step would be amitriptyline/he/lidocaine vaginal cream treatment   - If this is unsuccessful, I would recommend possible surgical intervention via TOT release/excision (tension likely contributing to pain, due to groin radiation on palpation), as well as vaginal band revision and/or trigger point injection. I counseled that I do not usually recommend repeat surgery for pain, however I have fourn structural areas that may respond to the above interventions if conservative therapy not successful      7. Abnormal defecation  This started after surgery, but no overt rectocele found. Her emptying symptoms are unusual, and will be followed.   - Referral to Physical Therapy first to help improve pelvic floor function    F/u for UDS/cysto             Misty Lee MD, FACOG    Female Pelvic Medicine and Reconstructive Surgery  Department of Obstetrics and Gynecology  [facility]        This medical record contains text  that has been entered with the assistance of computer voice recognition and dictation software.  Therefore, it may contain unintended errors in text, spelling, punctuation, or grammar

## 2022-05-23 NOTE — PROGRESS NOTES
"Urogynecology and Pelvic Reconstructive Surgery Consultation Visit    Maame David MRN:[id number] :1980    Referred by: Nguyen    Reason for Visit:   No chief complaint on file.        Subjective     History of Presenting Illness:    Ms.Jonna Harika David is a 42 y.o. year old P2 who presents for follow up of urinary incontinence, dysuria and dyspareunia since prior pelvic surgery. She is not s/p Cysto/UDS workup earlier today    She was referred to [facility] at last visit but has not yet been able to make an appointment.  She just got a call from them yesterday attempting to schedule.  She is very interested in starting physical therapy.    Also notes that recently she has noted even more vaginal dryness, and a protrusion in the front aspect of her vagina especially when she bears down or jumps.  This is bothersome and irritating.        Initial HPI: She presents for incontinence and dyspareunia since prior pelvic surgery.  She underwent hysterectomy in 2018 with Dr. Aguilera for large fibriod uterus and bleeding. She denies prolaspe symptoms or urinary incontinence pre-operatively but underwent complex prolapse repair including SSLF and sling procedure. This is when her pain started and urinary symptoms worsened.   She has had persistent bladder/urethral pain, as well as pain with intercourse.  She was diagnosed with a rectocele immediately after surgery by a different doctor.     Dyspareunia is described as focused on the inner left side, worst about 1-1.5 inches insiside. She never had pain with sex before surgery. She can now only have sex in a few positions and with significant discomfort (unable to have sex at all for 6 months after surgery). She has pain with most orgasms     She also reports new urinary incontinence, both with cough, laugh, but also without sensation and \"out the blue\".     She also feels a protrusion at the top/front of the vagina, which is worse with orgasm.     She also has to lean " back to defecate - she can't normally empty since surgery        Prior Pelvic surgery:   18: LAVH/BS, anterior repair, posterior repair, perineorrhaphy, SSLF, enterocele repair, transobturator sling (Dr. Aguilera) op reports reviewed    Prior treatment:   None for pain     Fluid intake:   2 cups water  1 cup coffee  1 cup tea    Pelvic floor symptom review:     Bladder:   Voids per day: 6-8 Voids per night: 0     Urinary incontinence episodes per day: 4-6    Urge leakage:  On Movement to Bathroom and Full Bladder   Stress leakage: With Cough, With Laugh and With Exercise   Continuous / insensible urine loss: Yes   Nocturnal enuresis: No    Leakage volume: Drops   Number of pads/day: 4-6    Bladder emptying: Incomplete   Voiding symptoms: Post-Void Dribble   UTI in last 12 months: No   Other urologic history: none      Prolapse:     Prolapse symptoms: Possible protrusion noted        Bowel:    Constipation: No    Straining to empty bowels: Yes - has to lean back to empty   Splinting to evacuate: No    Painful evacuation: No    Difficulty emptying rectum: Yes   Incontinence to stool: No   Incontinence to gas: No     Blood in stool: No       Sexual function:    Sexually active: Yes   Gender of partners: Male   Pain with intercourse: as above         Past medical and surgical history    Past obstetric history   Number of vaginal deliveries: 2   Number of  deliveries: 0   History of vacuum/forceps: No    History of obstetric anal sphincter injury: No     Past gynecological history:    Last menstrual period: Patient's last menstrual period was 2018 (approximate). s/p hyst       Past medical history:  Past Medical History:   Diagnosis Date   • Dental disorder 2018    upper and lower   • Urinary incontinence 2018    occasionally   • Endometriosis      Past surgical history:  Past Surgical History:   Procedure Laterality Date   • MANDI BY LAPAROSCOPY N/A 10/1/2018    Procedure: MANDI BY  LAPAROSCOPY;  Surgeon: Adal Villanueva M.D.;  [place] [facility]  Service: General   • VAGINAL HYSTERECTOMY SCOPE TOTAL  9/4/2018    Procedure: VAGINAL HYSTERECTOMY SCOPE TOTAL;  Surgeon: Atul Aguilera M.D.;  Location: SURGERY SAME DAY [facility];  Service: Gynecology   • SALPINGECTOMY Bilateral 9/4/2018    Procedure: SALPINGECTOMY;  Surgeon: Atul Aguilera M.D.;  Location: SURGERY SAME DAY [facility];  Service: Gynecology   • ANTERIOR AND POSTERIOR REPAIR  9/4/2018    Procedure: ANTERIOR AND POSTERIOR REPAIR - PERINEOPLASTY;  Surgeon: Atul Aguilera M.D.;  Location: SURGERY SAME DAY [facility];  Service: Gynecology   • ENTEROCELE REPAIR  9/4/2018    Procedure: ENTEROCELE REPAIR;  Surgeon: Atul Aguilera M.D.;  Location: SURGERY SAME DAY [facility];  Service: Gynecology   • BLADDER SLING FEMALE  9/4/2018    Procedure: BLADDER SLING FEMALE - TOT;  Surgeon: Atul Aguilera M.D.;  Location: SURGERY SAME DAY [facility];  Service: Gynecology   • VAGINAL SUSPENSION  9/4/2018    Procedure: VAGINAL SUSPENSION - SACROSPINOUS VAULT;  Surgeon: Atul Aguilera M.D.;  Location: SURGERY SAME DAY [facility];  Service: Gynecology   • GYN SURGERY  2007    ablation   • OTHER ORTHOPEDIC SURGERY Left     shoulder     Medications:currently has no medications in their medication list.  Allergies:Morphine, Penicillins, and Promethazine hcl  Family history:  Family History   Problem Relation Age of Onset   • Hypertension Mother    • Alcohol/Drug Father    • Diabetes Father    • Diabetes Paternal Grandmother    • Diabetes Paternal Grandfather      Social history: reports that she quit smoking about 13 years ago. She quit after 2.00 years of use. She has never used smokeless tobacco. She reports current alcohol use. She reports that she does not use drugs.    Review of systems: A full review of systems was performed, and negative with the exception of want is noted above in the HPI.        Objective         LMP 03/21/2018 (Approximate)     Physical Exam  Vitals reviewed. Exam conducted with a chaperone present (MA - see notes.).   Constitutional:       Appearance: Normal appearance.   HENT:      Head: Normocephalic.      Mouth/Throat:      Mouth: Mucous membranes are moist.   Cardiovascular:      Rate and Rhythm: Normal rate.   Pulmonary:      Effort: Pulmonary effort is normal.   Abdominal:      Palpations: Abdomen is soft. There is no mass.      Tenderness: There is no abdominal tenderness.   Skin:     General: Skin is warm and dry.   Neurological:      Mental Status: She is alert.   Psychiatric:         Mood and Affect: Mood normal.         Genitourinary:    External female genitalia: WNL   Vulva: WNL   Bulbocavernosus reflex: Intact   Anal wink reflex: Intact   Perineal sensation: WNL   Urethra: Not hypermobile -some protrusion when bearing down, no evidence of diverticulum.   Vagina: Vaginal band vs muscular spasm/hypertrophy felt on patient's left, approx 4cm proximal to hymen - this is area of peak tenderness   Atrophy: None   Cough stress test: Negative    Pelvic floor:   POP-Q: no significant prolapse on exam   Urethral tenderness: yes - palpation of sling causes pain which radiates to groin bilaterally   Bladder/ suprapubic tenderness: Yes   Levator tenderness: Bilateral   Levator muscle tone: Hypertonic   Pelvic floor contraction strength (modified Oxford scale): 2=Weak   Pelvic floor contraction duration: Brief    Bimanual exam: No Palpable Adnexal Masses   Vaginal band/stricture: Yes    Procedure Performed: Urodynamics (see report)  - Filling phase: Early increases sensation, but overall normal capacity.  Stress urinary incontinence at higher bladder volumes with normal leak point pressures.  Normal mid urethral closure pressure.  Normal capacity, normal compliance.  - Voiding phase: Complete emptying with detrusor contraction.    Diagnostic test and records review:    Urine dipstick: neg      Radiology: n/a    Documentation reviewed: Prior EMR Records             Assessment & Plan     Ms.Jonna Harika David is a 42 y.o. year old P2 with dyspareunia, pelvic pain, incontinence s/p prior hysterectomy and pelvic reconstructive surgery.     1. Urinary incontinence, mixed  2. Urinary incontinence without sensory awareness  3. History of suburethral sling procedure  - UDS showed stress incontinence towards capacity, but otherwise normal with normal emptying.  No bladder outlet obstruction from sling.  - Cysto showed normal structure without mesh exposure, masses, stones.  -All she does have stress urinary incontinence which can be bothersome, I prefer to maximize more conservative options as any other intervention may put her at risk for worsened pain symptoms.  She expressed understanding and agrees with this plan.  - Continue to maximize PFPT -she will be setting up an appointment soon  - She may require sling release/resection for pain if conservative options fail, which may affect incontinence.       4. Dyspareunia in female  5. Chronic female pelvic pain  Pain is directly related (temporally) to prior pelvic reconstructive surgery, which involved a/p repair and SSLF with ethibond. She has significant vaginal banding on examination, and pain radiating to the groin with sling palpation.    - As above, maximize physical therapy   - Prescription given today for amitriptyline/he/lidocaine vaginal cream, which is faxed to [facility].  Advised her to take this at least 1 hour before her physical therapy appointments  - Prescription also sent for gabapentin 100 mg up to 3 times daily as a second approach to treating nerve pain.  I advised that she start this if she has insufficient improvement symptoms after the estrogen and compounded cream treatments.   - If this is unsuccessful, I would recommend possible surgical intervention via TOT release/excision (tension likely contributing to pain, due  to groin radiation on palpation), as well as vaginal band revision and/or trigger point injection. I counseled that I do not usually recommend repeat surgery for pain, however I have found structural areas that may respond to the above interventions if conservative therapy not successful    7. Dysuria  8. Urethral irritation  9. Vaginal atrophy s/p hysterectomy USO  That her dysuria is coming from excessive nerve sensitization after the sling procedure, but she does have mild atrophy on examination, and other perimenopausal symptoms which may have been brought on early by her hysterectomy and USO.  For these reasons, I advised the two-pronged approach:  - I recommend starting low-dose vaginal estrogen therapy to treat atrophy around the urethra, with a 2-week ramp-up.  Prescription sent to her pharmacy.  -May use the amitriptyline/BALDEV/lidocaine cream, as noted above.  I recommend starting after initiation of estrogen cream therapy.    7. Abnormal defecation  This started after surgery, but no overt rectocele found. Her emptying symptoms are unusual, and will be followed.   - Continue physical therapy    Follow up in 2 months               Misty Lee MD, FACOG    Female Pelvic Medicine and Reconstructive Surgery  Department of Obstetrics and Gynecology  [facility]        This medical record contains text that has been entered with the assistance of computer voice recognition and dictation software.  Therefore, it may contain unintended errors in text, spelling, punctuation, or grammar

## 2022-05-23 NOTE — PROCEDURES
Procedure note: Complex urodynamic testing    Procedure performed:    -     98475 Complex Uroflowmetry  - 99616 Complex CMG w/ voiding pressure study AND urethral pressure  - 90085 EMG studies anal or urethral sphincter   - 72362 Intraabdominal catheter       Indication: Ms. David is a 42 year old with mixed incontinence s/p prior pelvic reconstructive procedure and sling. Her symptoms include:    Bladder symptoms:    Voids per day: 6-8 Voids per night: 0     Urinary incontinence episodes per day: 4-6    Urge leakage:  On Movement to Bathroom and Full Bladder   Stress leakage: With Cough, With Laugh and With Exercise   Continuous / insensible urine loss: Yes   Nocturnal enuresis: No    Leakage volume: Drops   Number of pads/day: 4-6    Bladder emptying: Incomplete   Voiding symptoms: Post-Void Dribble    She presents for complex urodynamic testing today to fully elucidate her bladder function and symptom pathophysiology.     Verbal consent was obtained after review of risk and benefit.     Chaperone: Louann Sanders    Procedure: The patient was taken to the urodynamic suite and placed in the urodynamic chair. She underwent sterile prep with betadine prior to catheterization. There was a negative urinalysis. Air-charged catheters were placed in the urethra/bladder and vagina. Urodynamics were performed using routine techniques. There were no complications.     Urodynamic findings:     Preliminary Uroflometry     o Flow pattern: continuous  o Maximum flow: 20 mL/sec  o Average flow: 12 mL/sec  o Voided volume: 142 mL  o Post-void residual: 0 mL  o Flow time: 81 sec    Filling cystometrogram    o First sensation: 9.9 mL  o First desire: 51 mL  o Strong Desire: 372 mL  o Urodynamic capacity: 429 mL   o Stress leakage: YES  o Uninhibited detrusor contractions present: NO  o Leakage with DO: NO  o Leak point pressures  - At 154mL volume: did NOT leak to 129 cm H2O  - At 300mL volume: LEAK to 173 cm  H2O  o Compliance: Normal    Urethral pressure profile    o Maximum urethral closing pressure (MUCP): 54 cm H2O  o Morphology: normal    Pressure voiding study    o The patient's voiding mechanism was accomplished by detrusor contraction and urethral relaxation, continuous pattern  o Max flow: 24 mL/sec  o Average flow: 9.8 mL/sec  o Post-void residual: 10 mL  o Pdet at peak flow: 15 cm H2O  o Flow time: 42 sec  o Pelvic floor EMG silenced during voiding: yes    Pelvic floor EMG: Normal     Assessment:     She has completed urodynamic testing, which was uncomplicated.     - Filling phase: Early increase sensation, but overall normal capacity.  Stress urinary incontinence at higher bladder volumes with normal leak point pressures.  Normal mid urethral closure pressure.  Normal capacity, normal compliance.  - Voiding phase: Complete emptying with detrusor contraction.    Plan:  - Results reviewed with the patient  - See office encounter for full procedural counseling  - Counseled on normal post-UDS symptoms including burning and possible hematuria. If this persists after 2 days she should call or send [employer] message.     Misty Lee MD, FACOG    Female Pelvic Medicine and Reconstructive Surgery  Department of Obstetrics and Gynecology  [facility]  [facility]

## 2022-05-24 ENCOUNTER — OFFICE VISIT (OUTPATIENT)
Dept: OBGYN | Facility: CLINIC | Age: 42
End: 2022-05-24
Payer: COMMERCIAL

## 2022-05-24 VITALS
HEART RATE: 80 BPM | BODY MASS INDEX: 34.37 KG/M2 | SYSTOLIC BLOOD PRESSURE: 129 MMHG | DIASTOLIC BLOOD PRESSURE: 75 MMHG | WEIGHT: 194 LBS

## 2022-05-24 VITALS
DIASTOLIC BLOOD PRESSURE: 75 MMHG | BODY MASS INDEX: 34.37 KG/M2 | HEART RATE: 80 BPM | SYSTOLIC BLOOD PRESSURE: 129 MMHG | WEIGHT: 194 LBS

## 2022-05-24 DIAGNOSIS — N39.42 URINARY INCONTINENCE WITHOUT SENSORY AWARENESS: ICD-10-CM

## 2022-05-24 DIAGNOSIS — N39.46 URINARY INCONTINENCE, MIXED: Primary | ICD-10-CM

## 2022-05-24 DIAGNOSIS — R30.0 DYSURIA: ICD-10-CM

## 2022-05-24 DIAGNOSIS — R10.2 CHRONIC FEMALE PELVIC PAIN: ICD-10-CM

## 2022-05-24 DIAGNOSIS — G89.29 CHRONIC FEMALE PELVIC PAIN: ICD-10-CM

## 2022-05-24 DIAGNOSIS — Z98.890 HISTORY OF SUBURETHRAL SLING PROCEDURE: ICD-10-CM

## 2022-05-24 DIAGNOSIS — N95.2 ATROPHIC VAGINITIS: ICD-10-CM

## 2022-05-24 DIAGNOSIS — N39.46 URINARY INCONTINENCE, MIXED: ICD-10-CM

## 2022-05-24 DIAGNOSIS — N94.10 DYSPAREUNIA IN FEMALE: ICD-10-CM

## 2022-05-24 LAB
APPEARANCE UR: CLEAR
BILIRUB UR STRIP-MCNC: NORMAL MG/DL
COLOR UR AUTO: YELLOW
GLUCOSE UR STRIP.AUTO-MCNC: NEGATIVE MG/DL
KETONES UR STRIP.AUTO-MCNC: NEGATIVE MG/DL
LEUKOCYTE ESTERASE UR QL STRIP.AUTO: NEGATIVE
NITRITE UR QL STRIP.AUTO: NEGATIVE
PH UR STRIP.AUTO: 6 [PH] (ref 5–8)
PROT UR QL STRIP: NEGATIVE MG/DL
RBC UR QL AUTO: NEGATIVE
SP GR UR STRIP.AUTO: >=1.03
UROBILINOGEN UR STRIP-MCNC: NORMAL MG/DL

## 2022-05-24 PROCEDURE — 81002 URINALYSIS NONAUTO W/O SCOPE: CPT | Performed by: STUDENT IN AN ORGANIZED HEALTH CARE EDUCATION/TRAINING PROGRAM

## 2022-05-24 PROCEDURE — 99214 OFFICE O/P EST MOD 30 MIN: CPT | Mod: 25 | Performed by: STUDENT IN AN ORGANIZED HEALTH CARE EDUCATION/TRAINING PROGRAM

## 2022-05-24 PROCEDURE — 52000 CYSTOURETHROSCOPY: CPT | Performed by: STUDENT IN AN ORGANIZED HEALTH CARE EDUCATION/TRAINING PROGRAM

## 2022-05-24 PROCEDURE — 51741 ELECTRO-UROFLOWMETRY FIRST: CPT | Mod: 51 | Performed by: STUDENT IN AN ORGANIZED HEALTH CARE EDUCATION/TRAINING PROGRAM

## 2022-05-24 PROCEDURE — 51784 ANAL/URINARY MUSCLE STUDY: CPT | Mod: 51 | Performed by: STUDENT IN AN ORGANIZED HEALTH CARE EDUCATION/TRAINING PROGRAM

## 2022-05-24 PROCEDURE — 51729 CYSTOMETROGRAM W/VP&UP: CPT | Mod: 51 | Performed by: STUDENT IN AN ORGANIZED HEALTH CARE EDUCATION/TRAINING PROGRAM

## 2022-05-24 PROCEDURE — 51797 INTRAABDOMINAL PRESSURE TEST: CPT | Performed by: STUDENT IN AN ORGANIZED HEALTH CARE EDUCATION/TRAINING PROGRAM

## 2022-05-24 RX ORDER — ESTRADIOL 0.1 MG/G
CREAM VAGINAL
Qty: 1 EACH | Refills: 3 | Status: SHIPPED | OUTPATIENT
Start: 2022-05-24 | End: 2022-10-07

## 2022-05-24 RX ORDER — PHENAZOPYRIDINE HYDROCHLORIDE 200 MG/1
200 TABLET, FILM COATED ORAL ONCE
Status: COMPLETED | OUTPATIENT
Start: 2022-05-24 | End: 2022-05-24

## 2022-05-24 RX ORDER — GABAPENTIN 100 MG/1
100 CAPSULE ORAL 3 TIMES DAILY
Qty: 90 CAPSULE | Refills: 0 | Status: SHIPPED | OUTPATIENT
Start: 2022-05-24 | End: 2022-10-07

## 2022-05-24 RX ADMIN — PHENAZOPYRIDINE HYDROCHLORIDE 200 MG: 200 TABLET, FILM COATED ORAL at 11:24

## 2022-05-24 NOTE — PROCEDURES
Procedure note: Cystourethroscopy    Procedure performed: Cystourethroscopy (52525)      Indication: Ms. David is a 42 year old with LUTS, dysuria, hesitancy after prior mesh suburethral sling placement.  She presents for office diagnostic cystourethroscopy testing today to fully elucidate her bladder structure or underlying cause for symptoms. Written consent was obtained after review of risk and benefit.       Cystoscopy    Date/Time: 5/24/2022 9:16 AM  Performed by: Misty Lee M.D.  Authorized by: Misty Lee M.D.     Procedure discussed: discussed risks, benefits and alternatives    Chaperone present: yes    Timeout: timeout called immediately prior to procedure    Prep: patient was prepped and draped in usual sterile fashion    Prep type: Betadine    Anesthesia: local anesthesia      Procedure Details     Cystoscope type: flexible    Cystoscopy route: transurethral      Irrigation used: saline      Position: dorsal lithotomy    Urethra     Urethra: normal      Vagina     Vagina: normal      Bladder     Bladder comment: Normal urothelium, no masses, stones, mesh exposures in the bladder or urethra.    Post-Procedure Details     Appearance of urine after procedure: clear    Outcome: patient tolerated procedure well with no complications      Post-procedure interventions: post-procedure instructions given      Disposition: discharged home in satisfactory condition

## 2022-05-27 ENCOUNTER — TELEPHONE (OUTPATIENT)
Dept: OBGYN | Facility: CLINIC | Age: 42
End: 2022-05-27
Payer: COMMERCIAL

## 2022-05-27 DIAGNOSIS — R39.9 UTI SYMPTOMS: ICD-10-CM

## 2022-05-27 RX ORDER — FLUCONAZOLE 150 MG/1
150 TABLET ORAL DAILY
Qty: 1 TABLET | Refills: 0 | Status: SHIPPED | OUTPATIENT
Start: 2022-05-27 | End: 2022-10-07

## 2022-05-27 NOTE — TELEPHONE ENCOUNTER
Pt called in asking if  could prescribe something for a UTI/yeast infection. I asked what symptoms she is experiencing and she stated, burning when urinating, urgency but very little relief, white discharge and slight vaginal itchiness. I let the Pt know I will send a message to him and if he gives me a response I can notify her over mychart or the phone. Pt understood and had no further questions.

## 2022-07-11 ENCOUNTER — OFFICE VISIT (OUTPATIENT)
Dept: URGENT CARE | Facility: CLINIC | Age: 42
End: 2022-07-11
Payer: COMMERCIAL

## 2022-07-11 ENCOUNTER — APPOINTMENT (OUTPATIENT)
Dept: RADIOLOGY | Facility: IMAGING CENTER | Age: 42
End: 2022-07-11
Attending: PHYSICIAN ASSISTANT
Payer: COMMERCIAL

## 2022-07-11 VITALS
DIASTOLIC BLOOD PRESSURE: 70 MMHG | WEIGHT: 194 LBS | TEMPERATURE: 97.8 F | HEIGHT: 63 IN | BODY MASS INDEX: 34.38 KG/M2 | SYSTOLIC BLOOD PRESSURE: 120 MMHG | HEART RATE: 96 BPM | RESPIRATION RATE: 14 BRPM | OXYGEN SATURATION: 96 %

## 2022-07-11 DIAGNOSIS — M25.561 ACUTE PAIN OF RIGHT KNEE: ICD-10-CM

## 2022-07-11 PROCEDURE — 99213 OFFICE O/P EST LOW 20 MIN: CPT | Performed by: PHYSICIAN ASSISTANT

## 2022-07-11 PROCEDURE — 73562 X-RAY EXAM OF KNEE 3: CPT | Mod: TC,RT | Performed by: PHYSICIAN ASSISTANT

## 2022-07-11 NOTE — PROGRESS NOTES
Subjective:   Maame David is a 42 y.o. female who presents for Knee Pain      HPI  The patient presents to the Urgent Care with complaints of right knee pain onset yesterday.  She does report a prior right knee injury about a year ago after climbing into a rock crawler she felt a snap and immediate pain.  Since then until about 2 months ago she had intermittent right knee pain and swelling.  2 months ago, she had pain from bending down.  She had not had any pain in the last 2 months until yesterday.  Denies any known injury or trauma yesterday.  She has been on her feet a lot last week due to work.  She locates the pain primarily to the lateral upper knee with associated swelling to her knee.  Sometimes she feels pain to the back of her knee.  The pain is worse with ambulating, pushing, bending, extending.  Some numbness with the swelling is. Denies any recent illness, fever, chills, tingling, weakness.     Medications:    • estradiol  • fluconazole  • gabapentin Caps    Allergies: Morphine, Penicillins, and Promethazine hcl    Problem List: Maame David does not have any pertinent problems on file.    Surgical History:  Past Surgical History:   Procedure Laterality Date   • MANDI BY LAPAROSCOPY N/A 10/1/2018    Procedure: MANDI BY LAPAROSCOPY;  Surgeon: Adal Villanueva M.D.;  Location: [facility];  Service: General   • VAGINAL HYSTERECTOMY SCOPE TOTAL  9/4/2018    Procedure: VAGINAL HYSTERECTOMY SCOPE TOTAL;  Surgeon: Atul Aguilera M.D.;  Location: SURGERY SAME DAY [facility];  Service: Gynecology   • SALPINGECTOMY Bilateral 9/4/2018    Procedure: SALPINGECTOMY;  Surgeon: Atul Aguilera M.D.;  Location: SURGERY SAME DAY [facility];  Service: Gynecology   • ANTERIOR AND POSTERIOR REPAIR  9/4/2018    Procedure: ANTERIOR AND POSTERIOR REPAIR - PERINEOPLASTY;  Surgeon: Atul Aguilera M.D.;  Location: SURGERY SAME DAY [facility];  Service: Gynecology   • ENTEROCELE REPAIR  9/4/2018     "Procedure: ENTEROCELE REPAIR;  Surgeon: Atul Aguilera M.D.;  Location: SURGERY SAME DAY [facility];  Service: Gynecology   • BLADDER SLING FEMALE  9/4/2018    Procedure: BLADDER SLING FEMALE - TOT;  Surgeon: Atul Aguilera M.D.;  Location: SURGERY SAME DAY [facility];  Service: Gynecology   • VAGINAL SUSPENSION  9/4/2018    Procedure: VAGINAL SUSPENSION - SACROSPINOUS VAULT;  Surgeon: Atul Aguilera M.D.;  Location: SURGERY SAME DAY [facility];  Service: Gynecology   • GYN SURGERY  2007    ablation   • OTHER ORTHOPEDIC SURGERY Left     shoulder       Past Social Hx: Maame David  reports that she quit smoking about 13 years ago. She quit after 2.00 years of use. She has never used smokeless tobacco. She reports current alcohol use. She reports that she does not use drugs.     Past Family Hx:  Maame David family history includes Alcohol/Drug in her father; Diabetes in her father, paternal grandfather, and paternal grandmother; Hypertension in her mother.     Problem list, medications, and allergies reviewed by myself today in Epic.     Objective:     /70   Pulse 96   Temp 36.6 °C (97.8 °F)   Resp 14   Ht 1.6 m (5' 3\")   Wt 88 kg (194 lb)   LMP 03/21/2018 (Approximate)   SpO2 96%   BMI 34.37 kg/m²     Physical Exam  Vitals reviewed.   Constitutional:       General: She is not in acute distress.     Appearance: Normal appearance. She is not ill-appearing or toxic-appearing.   Eyes:      Conjunctiva/sclera: Conjunctivae normal.      Pupils: Pupils are equal, round, and reactive to light.   Cardiovascular:      Rate and Rhythm: Normal rate.   Pulmonary:      Effort: Pulmonary effort is normal.   Musculoskeletal:      Cervical back: Neck supple.      Right knee: Swelling (focal swelling to distal vastus medialus just lateral to patella. ) present. No deformity, erythema, ecchymosis, bony tenderness or crepitus. Normal range of motion. Tenderness (lateral to supra patellar area ) " present. No medial joint line or lateral joint line tenderness. No LCL laxity, MCL laxity, ACL laxity or PCL laxity. Normal pulse.      Instability Tests: Anterior drawer test negative. Posterior drawer test negative. Anterior Lachman test negative. Medial Haider test positive. Lateral Haider test negative.      Right lower leg: Normal. No swelling, tenderness or bony tenderness.      Right ankle: Normal pulse.   Skin:     General: Skin is warm and dry.   Neurological:      General: No focal deficit present.      Mental Status: She is alert and oriented to person, place, and time.   Psychiatric:         Mood and Affect: Mood normal.         Behavior: Behavior normal.       RADIOLOGY RESULTS   DX-KNEE 3 VIEWS RIGHT    Result Date: 7/11/2022 7/11/2022 3:35 PM HISTORY/REASON FOR EXAM:  Pain/Deformity Following Trauma Pain and swelling started yesterday, Pt reports hx of Rt knee injury TECHNIQUE/EXAM DESCRIPTION AND NUMBER OF VIEWS:  3 views of the RIGHT knee. COMPARISON: None FINDINGS: No acute fracture or dislocation. No joint osteoarthritis No knee joint effusion.     1. No acute osseous abnormality.       Diagnosis and associated orders:     1. Acute pain of right knee  - DX-KNEE 3 VIEWS RIGHT; Future  - Referral to Sports Medicine       Comments/MDM:     • Discussed with patient her presenting symptoms and exam findings could be due to a bursitis versus tendinitis versus meniscus pathology.  She does have a positive medial Haider's test on exam.  No obvious joint line effusion or swelling.  Recommend rest, elevation, ice application, ibuprofen.  We will place referral to sports medicine for further evaluation and treatment.       I personally reviewed prior external notes and test results pertinent to today's visit. Supportive care, natural history, differential diagnoses, and indications for immediate follow-up discussed. Patient expresses understanding and agrees to plan. Patient denies any other questions  or concerns.     Follow-up with the primary care physician for recheck, reevaluation, and consideration of further management.    Please note that this dictation was created using voice recognition software. I have made a reasonable attempt to correct obvious errors, but I expect that there are errors of grammar and possibly content that I did not discover before finalizing the note.    This note was electronically signed by Ilan Castle PA-C

## 2022-08-19 ENCOUNTER — OFFICE VISIT (OUTPATIENT)
Dept: URGENT CARE | Facility: CLINIC | Age: 42
End: 2022-08-19
Payer: COMMERCIAL

## 2022-08-19 ENCOUNTER — HOSPITAL ENCOUNTER (OUTPATIENT)
Facility: MEDICAL CENTER | Age: 42
End: 2022-08-19
Attending: NURSE PRACTITIONER
Payer: COMMERCIAL

## 2022-08-19 DIAGNOSIS — B02.9 HERPES ZOSTER WITHOUT COMPLICATION: ICD-10-CM

## 2022-08-19 LAB — AMBIGUOUS DTTM AMBI4: NORMAL

## 2022-08-19 PROCEDURE — 87529 HSV DNA AMP PROBE: CPT

## 2022-08-19 PROCEDURE — 99214 OFFICE O/P EST MOD 30 MIN: CPT | Performed by: NURSE PRACTITIONER

## 2022-08-19 PROCEDURE — 87798 DETECT AGENT NOS DNA AMP: CPT

## 2022-08-19 RX ORDER — VALACYCLOVIR HYDROCHLORIDE 1 G/1
1000 TABLET, FILM COATED ORAL 3 TIMES DAILY
Qty: 21 TABLET | Refills: 0 | Status: SHIPPED | OUTPATIENT
Start: 2022-08-19 | End: 2022-08-26

## 2022-08-19 ASSESSMENT — ENCOUNTER SYMPTOMS
CHILLS: 0
MYALGIAS: 1
FEVER: 0

## 2022-08-20 NOTE — PROGRESS NOTES
"Subjective:     Maame David is a 42 y.o. female who presents for No chief complaint on file.      HPI  Pt presents for evaluation of a new problem. Maame is a very pleasant 42-year-old female who presents to urgent care today with complaints of a painful vesicular rash to her left posterior thigh that she first noticed 3 days ago.  She states initially this started as an urticarial rash however, has now become very painful.  She notes discomfort anytime she sits or when this is lightly brushed.  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  She states associated symptoms include fatigue, chills and tactile fever.  Additionally, she has been under a great deal of stress recently.  She notes no history of HSV infections.  She notes no close exposure or skin to skin talk contact with any known sources of monkey pox.    Review of Systems   Constitutional:  Positive for malaise/fatigue. Negative for chills and fever.   Musculoskeletal:  Positive for myalgias.   Skin:  Positive for rash. Negative for itching.     PMH:   Past Medical History:   Diagnosis Date    Dental disorder 08/21/2018    upper and lower    Endometriosis     Urinary incontinence 08/21/2018    occasionally     ALLERGIES:   Allergies   Allergen Reactions    Morphine Unspecified     Flushing and felt hot    Penicillins Anaphylaxis     Stopped breathing when 9 yo  Tolerates Keflex, Tolerates Ceftriaxone    Promethazine Hcl Unspecified     \"mini seizures\"     SURGHX:   Past Surgical History:   Procedure Laterality Date    MANDI BY LAPAROSCOPY N/A 10/1/2018    Procedure: MANDI BY LAPAROSCOPY;  Surgeon: Adal Villanueva M.D.;  Location: SURGERY [facility];  Service: General    VAGINAL HYSTERECTOMY SCOPE TOTAL  9/4/2018    Procedure: VAGINAL HYSTERECTOMY SCOPE TOTAL;  Surgeon: Atul Aguilera M.D.;  Location: SURGERY SAME DAY [facility];  Service: Gynecology    SALPINGECTOMY Bilateral 9/4/2018    " Procedure: SALPINGECTOMY;  Surgeon: Atul Aguilera M.D.;  Location: SURGERY SAME DAY [facility];  Service: Gynecology    ANTERIOR AND POSTERIOR REPAIR  2018    Procedure: ANTERIOR AND POSTERIOR REPAIR - PERINEOPLASTY;  Surgeon: Atul Aguilera M.D.;  Location: SURGERY SAME DAY [facility];  Service: Gynecology    ENTEROCELE REPAIR  2018    Procedure: ENTEROCELE REPAIR;  Surgeon: Atul Aguilera M.D.;  Location: SURGERY SAME DAY [facility];  Service: Gynecology    BLADDER SLING FEMALE  2018    Procedure: BLADDER SLING FEMALE - TOT;  Surgeon: Atul Aguilera M.D.;  Location: SURGERY SAME DAY [facility];  Service: Gynecology    VAGINAL SUSPENSION  2018    Procedure: VAGINAL SUSPENSION - SACROSPINOUS VAULT;  Surgeon: Atul Aguilera M.D.;  Location: SURGERY SAME DAY [facility];  Service: Gynecology    GYN SURGERY  2007    ablation    OTHER ORTHOPEDIC SURGERY Left     shoulder     SOCHX:   Social History     Socioeconomic History    Marital status: Single   Tobacco Use    Smoking status: Former     Years: 2.00     Types: Cigarettes     Quit date: 2009     Years since quittin.5    Smokeless tobacco: Never   Vaping Use    Vaping Use: Never used   Substance and Sexual Activity    Alcohol use: Yes     Comment: 1-2 per month    Drug use: No    Sexual activity: Yes     Partners: Male     FH:   Family History   Problem Relation Age of Onset    Hypertension Mother     Alcohol/Drug Father     Diabetes Father     Diabetes Paternal Grandmother     Diabetes Paternal Grandfather          Objective:   LMP 2018 (Approximate)     Physical Exam  Vitals and nursing note reviewed.   Constitutional:       General: She is not in acute distress.     Appearance: Normal appearance. She is not ill-appearing.   HENT:      Head: Normocephalic and atraumatic.      Right Ear: External ear normal.      Left Ear: External ear normal.      Nose: No congestion or rhinorrhea.      Mouth/Throat:      Mouth:  Mucous membranes are moist.   Eyes:      Extraocular Movements: Extraocular movements intact.      Pupils: Pupils are equal, round, and reactive to light.   Cardiovascular:      Rate and Rhythm: Normal rate and regular rhythm.      Pulses: Normal pulses.      Heart sounds: Normal heart sounds.   Pulmonary:      Effort: Pulmonary effort is normal.      Breath sounds: Normal breath sounds.   Abdominal:      General: Abdomen is flat. Bowel sounds are normal.      Palpations: Abdomen is soft.      Tenderness: There is abdominal tenderness. There is no right CVA tenderness or left CVA tenderness.   Musculoskeletal:         General: Normal range of motion.      Cervical back: Normal range of motion and neck supple.   Skin:     General: Skin is warm and dry.      Capillary Refill: Capillary refill takes less than 2 seconds.             Comments: Cluster of fluid-filled vesicles present to left posterior thigh.  Erythema present.  Negative for swelling, ecchymosis or active drainage.  There is pain with palpation.   Neurological:      General: No focal deficit present.      Mental Status: She is alert and oriented to person, place, and time. Mental status is at baseline.   Psychiatric:         Mood and Affect: Mood normal.         Behavior: Behavior normal.         Thought Content: Thought content normal.         Judgment: Judgment normal.       Assessment/Plan:   Assessment    1. Herpes zoster without complication  valacyclovir (VALTREX) 1 GM Tab    HSV (HERPES SIMPLEX VIRUS) BY PCR (BLOOD)      Differential diagnoses discussed.   Very low concern for monkey pox at this time.  Her rash is consistent with herpes zoster.  She was empirically started on valacyclovir for treatment.  She notes no need for pain medication at this time.  PCR HSV/Zoster swab collected in clinic and sent to lab for evaluation of infection.   AVS handout given and reviewed with patient. Pt educated on red flags and when to seek treatment back in ER  or UC.

## 2022-08-25 LAB
SPECIMEN SOURCE: NORMAL
VZV DNA SPEC QL NAA+PROBE: NOT DETECTED

## 2022-08-29 LAB
HSV1 DNA CSF QL NAA+PROBE: NOT DETECTED
HSV2 DNA CSF QL NAA+PROBE: DETECTED
SPECIMEN SOURCE: ABNORMAL
SPECIMEN SOURCE: NORMAL

## 2022-10-07 ENCOUNTER — OFFICE VISIT (OUTPATIENT)
Dept: MEDICAL GROUP | Facility: PHYSICIAN GROUP | Age: 42
End: 2022-10-07
Payer: COMMERCIAL

## 2022-10-07 ENCOUNTER — TELEPHONE (OUTPATIENT)
Dept: MEDICAL GROUP | Facility: PHYSICIAN GROUP | Age: 42
End: 2022-10-07

## 2022-10-07 VITALS
HEART RATE: 79 BPM | WEIGHT: 196.4 LBS | TEMPERATURE: 97.7 F | BODY MASS INDEX: 34.8 KG/M2 | OXYGEN SATURATION: 97 % | DIASTOLIC BLOOD PRESSURE: 82 MMHG | SYSTOLIC BLOOD PRESSURE: 120 MMHG | HEIGHT: 63 IN

## 2022-10-07 DIAGNOSIS — Z00.00 PREVENTATIVE HEALTH CARE: ICD-10-CM

## 2022-10-07 DIAGNOSIS — K91.5 POST-CHOLECYSTECTOMY SYNDROME: ICD-10-CM

## 2022-10-07 DIAGNOSIS — E66.9 OBESITY (BMI 30-39.9): ICD-10-CM

## 2022-10-07 DIAGNOSIS — M79.18 MYOFASCIAL MUSCLE PAIN: ICD-10-CM

## 2022-10-07 DIAGNOSIS — R19.7 DIARRHEA, UNSPECIFIED TYPE: ICD-10-CM

## 2022-10-07 DIAGNOSIS — R51.9 NONINTRACTABLE HEADACHE, UNSPECIFIED CHRONICITY PATTERN, UNSPECIFIED HEADACHE TYPE: ICD-10-CM

## 2022-10-07 PROBLEM — R30.0 DYSURIA: Status: RESOLVED | Noted: 2022-05-09 | Resolved: 2022-10-07

## 2022-10-07 PROBLEM — R14.0 BLOATING: Status: RESOLVED | Noted: 2017-06-27 | Resolved: 2022-10-07

## 2022-10-07 PROBLEM — N80.9 ENDOMETRIOSIS: Status: RESOLVED | Noted: 2017-06-27 | Resolved: 2022-10-07

## 2022-10-07 PROBLEM — K80.20 CHOLELITHIASIS: Status: RESOLVED | Noted: 2018-10-01 | Resolved: 2022-10-07

## 2022-10-07 PROBLEM — E66.09 NON MORBID OBESITY DUE TO EXCESS CALORIES: Status: RESOLVED | Noted: 2017-06-27 | Resolved: 2022-10-07

## 2022-10-07 PROCEDURE — 99214 OFFICE O/P EST MOD 30 MIN: CPT | Performed by: FAMILY MEDICINE

## 2022-10-07 RX ORDER — MONTELUKAST SODIUM 4 MG/1
1 TABLET, CHEWABLE ORAL 2 TIMES DAILY
Qty: 60 TABLET | Refills: 11 | Status: SHIPPED | OUTPATIENT
Start: 2022-10-07 | End: 2022-11-06

## 2022-10-07 ASSESSMENT — PATIENT HEALTH QUESTIONNAIRE - PHQ9: CLINICAL INTERPRETATION OF PHQ2 SCORE: 0

## 2022-10-07 NOTE — PROGRESS NOTES
"CHIEF COMPLAINT / REASON FOR VISIT  Maame David is a 42 y.o. female that presents today to [facility] care.    HISTORY OF PRESENT ILLNESS    History migraines for years but stopped approximately 1 year ago.   Randomly (once per month) gets sharp pain left temple, lasts 30 seconds, no autonomic symptoms, no associated headache or facial drooping, nothing has triggered it    Trigger point?   - \"can't get shoulders to relax\", bilateral trapezius tightness and pain, tried ibuprofen x 4 which didn't work. Has bothered her on and off for years, but has gotten particularly bad over past couple of days    Weight loss  Has done different diets (keto, no carb) and exercise  Has tried phentermine but didn't like the stimulant effect    Since gallbladder removal, left lower quadrant abdominal pain loose stools and diarrhea after eating.     Past Medical History  Dyspareunia, chronic pelvic pain -follows with gynecology  Bilateral tonsillar swelling (since childhood)    Past Surgical History  Laparoscopic cholecystectomy  Total vaginal hysterectomy and unilateral salpingectomy and oophorectomy  Enterocele repair  Bladder sling    Objective      /82 (BP Location: Left arm, Patient Position: Sitting, BP Cuff Size: Adult)   Pulse 79   Temp 36.5 °C (97.7 °F) (Temporal)   Ht 1.6 m (5' 3\")   Wt 89.1 kg (196 lb 6.4 oz)   LMP 03/21/2018 (Approximate)   SpO2 97%   BMI 34.79 kg/m²  Body mass index is 34.79 kg/m².    PHYSICAL EXAM  Constitutional: Sitting comfortably, in no acute distress, responds to questions appropriately.  Head: Normocephalic, no tenderness to palpation of temporal arteries bilaterally  Eyes:  No conjunctival injection, no scleral icterus, PERRL  Mouth: Oral mucosa moist. Good dentition  Throat: Bilateral tonsillar hypertrophy without exudate  Neck: No cervical lymphadenopathy  Heart: Regular S1 S2, no murmurs, rub, or gallops  Lungs: Clear to auscultation bilaterally, no wheezes, rales, or " rhonchi  Abdomen: Soft, nontender, nondistended  MSK: Notable bilateral trapezius muscular tension, mildly tender superior to bilateral scapula and medial to bilateral scapula  Skin: Warm and dry, no rashes or lesions on face or exposed upper extremities    ASSESSMENT & PLAN  1. Obesity (BMI 30-39.9)  Will trial Contrave for assistance with weight loss.  She is tried multiple diets and is attempting to increase her exercise.  - Patient identified as having weight management issue.  Appropriate orders and counseling given.  - Lipid Profile; Future  - APOLIPOPROTEIN B; Future  - Comp Metabolic Panel; Future  - INSULIN FASTING; Future  - Naltrexone-buPROPion HCl ER 8-90 MG TABLET SR 12 HR; 1 tablet once daily in the morning for 1 week; increase as tolerated in weekly intervals: 1 tablet twice daily for 1 week; then 2 tablets in the morning and 1 tablet in the evening for 1 week; and then 2 tablets twice daily  Dispense: 120 Tablet; Refill: 3    2. Diarrhea, unspecified type  3. Post-cholecystectomy syndrome  Suspect postcholecystectomy diarrhea given the timeframe and symptoms.  We will trial a bile acid binding resin and assess for improvement  - CBC WITH DIFFERENTIAL; Future  - Comp Metabolic Panel; Future  - colestipol (COLESTID) 1 GM Tab; Take 1 Tablet by mouth 2 times a day.  Dispense: 60 Tablet; Refill: 11    4. Myofascial muscle pain  Bilateral trapezius myofascial pain.  We will perform trigger point injections today with lidocaine (see procedure note for details)    5. Nonintractable headache, unspecified chronicity pattern, unspecified headache type  Unsure of etiology, given the location we will obtain CRP but she does not have any temporal artery tenderness to suggest temporal arteritis.  Trigeminal neuralgia is in the differential but her pains only occur occasionally.  No red flag symptoms or neurologic deficits  - CRP QUANTITIVE (NON-CARDIAC); Future    6. Preventative health care  - Lipid Profile;  Future  - APOLIPOPROTEIN B; Future  - CBC WITH DIFFERENTIAL; Future  - Comp Metabolic Panel; Future  - INSULIN FASTING; Future  - CRP QUANTITIVE (NON-CARDIAC); Future      Follow-up in 2 to 3 weeks to discuss labs and follow-up on weight and diarrhea

## 2022-10-07 NOTE — TELEPHONE ENCOUNTER
Received request via: Patient    Was the patient seen in the last year in this department? Yes    Does the patient have an active prescription (recently filled or refills available) for medication(s) requested? Naltrexon-bupropion;  Patient called in to advise that the prescription for the above medication was going to cost her 600.00, she wouldl osiel a different medication sent in. She stated she now does not mind if you have to call in phentermine however if this is going to happen she is requesting the low dose.

## 2022-10-10 ENCOUNTER — PATIENT MESSAGE (OUTPATIENT)
Dept: MEDICAL GROUP | Facility: PHYSICIAN GROUP | Age: 42
End: 2022-10-10
Payer: COMMERCIAL

## 2022-10-10 DIAGNOSIS — R73.03 PREDIABETES: ICD-10-CM

## 2022-10-10 DIAGNOSIS — E66.9 OBESITY (BMI 30-39.9): ICD-10-CM

## 2022-10-24 ENCOUNTER — HOSPITAL ENCOUNTER (OUTPATIENT)
Dept: LAB | Facility: MEDICAL CENTER | Age: 42
End: 2022-10-24
Attending: FAMILY MEDICINE
Payer: COMMERCIAL

## 2022-10-24 DIAGNOSIS — Z00.00 PREVENTATIVE HEALTH CARE: ICD-10-CM

## 2022-10-24 DIAGNOSIS — R19.7 DIARRHEA, UNSPECIFIED TYPE: ICD-10-CM

## 2022-10-24 DIAGNOSIS — R51.9 NONINTRACTABLE HEADACHE, UNSPECIFIED CHRONICITY PATTERN, UNSPECIFIED HEADACHE TYPE: ICD-10-CM

## 2022-10-24 DIAGNOSIS — E66.9 OBESITY (BMI 30-39.9): ICD-10-CM

## 2022-10-24 LAB
ALBUMIN SERPL BCP-MCNC: 4.3 G/DL (ref 3.2–4.9)
ALBUMIN/GLOB SERPL: 1.4 G/DL
ALP SERPL-CCNC: 70 U/L (ref 30–99)
ALT SERPL-CCNC: 15 U/L (ref 2–50)
ANION GAP SERPL CALC-SCNC: 10 MMOL/L (ref 7–16)
AST SERPL-CCNC: 13 U/L (ref 12–45)
BASOPHILS # BLD AUTO: 0.9 % (ref 0–1.8)
BASOPHILS # BLD: 0.07 K/UL (ref 0–0.12)
BILIRUB SERPL-MCNC: 0.4 MG/DL (ref 0.1–1.5)
BUN SERPL-MCNC: 11 MG/DL (ref 8–22)
CALCIUM SERPL-MCNC: 9 MG/DL (ref 8.5–10.5)
CHLORIDE SERPL-SCNC: 106 MMOL/L (ref 96–112)
CHOLEST SERPL-MCNC: 182 MG/DL (ref 100–199)
CO2 SERPL-SCNC: 24 MMOL/L (ref 20–33)
CREAT SERPL-MCNC: 0.61 MG/DL (ref 0.5–1.4)
CRP SERPL HS-MCNC: <0.3 MG/DL (ref 0–0.75)
EOSINOPHIL # BLD AUTO: 0.09 K/UL (ref 0–0.51)
EOSINOPHIL NFR BLD: 1.1 % (ref 0–6.9)
ERYTHROCYTE [DISTWIDTH] IN BLOOD BY AUTOMATED COUNT: 44.6 FL (ref 35.9–50)
GFR SERPLBLD CREATININE-BSD FMLA CKD-EPI: 115 ML/MIN/1.73 M 2
GLOBULIN SER CALC-MCNC: 3.1 G/DL (ref 1.9–3.5)
GLUCOSE SERPL-MCNC: 102 MG/DL (ref 65–99)
HCT VFR BLD AUTO: 43.4 % (ref 37–47)
HDLC SERPL-MCNC: 53 MG/DL
HGB BLD-MCNC: 14 G/DL (ref 12–16)
IMM GRANULOCYTES # BLD AUTO: 0.02 K/UL (ref 0–0.11)
IMM GRANULOCYTES NFR BLD AUTO: 0.2 % (ref 0–0.9)
LDLC SERPL CALC-MCNC: 109 MG/DL
LYMPHOCYTES # BLD AUTO: 2.43 K/UL (ref 1–4.8)
LYMPHOCYTES NFR BLD: 30 % (ref 22–41)
MCH RBC QN AUTO: 29.5 PG (ref 27–33)
MCHC RBC AUTO-ENTMCNC: 32.3 G/DL (ref 33.6–35)
MCV RBC AUTO: 91.6 FL (ref 81.4–97.8)
MONOCYTES # BLD AUTO: 0.5 K/UL (ref 0–0.85)
MONOCYTES NFR BLD AUTO: 6.2 % (ref 0–13.4)
NEUTROPHILS # BLD AUTO: 4.98 K/UL (ref 2–7.15)
NEUTROPHILS NFR BLD: 61.6 % (ref 44–72)
NRBC # BLD AUTO: 0 K/UL
NRBC BLD-RTO: 0 /100 WBC
PLATELET # BLD AUTO: 233 K/UL (ref 164–446)
PMV BLD AUTO: 11.7 FL (ref 9–12.9)
POTASSIUM SERPL-SCNC: 4 MMOL/L (ref 3.6–5.5)
PROT SERPL-MCNC: 7.4 G/DL (ref 6–8.2)
RBC # BLD AUTO: 4.74 M/UL (ref 4.2–5.4)
SODIUM SERPL-SCNC: 140 MMOL/L (ref 135–145)
TRIGL SERPL-MCNC: 99 MG/DL (ref 0–149)
WBC # BLD AUTO: 8.1 K/UL (ref 4.8–10.8)

## 2022-10-24 PROCEDURE — 80053 COMPREHEN METABOLIC PANEL: CPT

## 2022-10-24 PROCEDURE — 83525 ASSAY OF INSULIN: CPT

## 2022-10-24 PROCEDURE — 36415 COLL VENOUS BLD VENIPUNCTURE: CPT

## 2022-10-24 PROCEDURE — 85025 COMPLETE CBC W/AUTO DIFF WBC: CPT

## 2022-10-24 PROCEDURE — 86140 C-REACTIVE PROTEIN: CPT

## 2022-10-24 PROCEDURE — 80061 LIPID PANEL: CPT

## 2022-10-24 PROCEDURE — 82172 ASSAY OF APOLIPOPROTEIN: CPT

## 2022-10-25 LAB — APO B100 SERPL-MCNC: 102 MG/DL (ref 55–125)

## 2022-10-26 PROBLEM — R73.03 PREDIABETES: Status: ACTIVE | Noted: 2022-10-26

## 2022-10-26 LAB — INSULIN P FAST SERPL-ACNC: 15 UIU/ML (ref 3–25)

## 2022-10-26 NOTE — RESULT ENCOUNTER NOTE
Fasting glucose slightly elevated at 102, indicating impaired fasting glucose or prediabetes. Given her obesity and desire for weight loss, it would be reasonable to try tirzepatide (Mounjaro) 2.5 mg once weekly for 4 weeks, increasing by 2.5 mg every 4 weeks as tolerated for weight loss and glycemic control.

## 2022-11-06 ENCOUNTER — HOSPITAL ENCOUNTER (OUTPATIENT)
Facility: MEDICAL CENTER | Age: 42
End: 2022-11-06
Attending: NURSE PRACTITIONER
Payer: COMMERCIAL

## 2022-11-06 ENCOUNTER — OFFICE VISIT (OUTPATIENT)
Dept: URGENT CARE | Facility: CLINIC | Age: 42
End: 2022-11-06
Payer: COMMERCIAL

## 2022-11-06 ENCOUNTER — APPOINTMENT (OUTPATIENT)
Dept: RADIOLOGY | Facility: IMAGING CENTER | Age: 42
End: 2022-11-06
Attending: NURSE PRACTITIONER
Payer: COMMERCIAL

## 2022-11-06 VITALS
BODY MASS INDEX: 34.91 KG/M2 | WEIGHT: 197 LBS | HEIGHT: 63 IN | SYSTOLIC BLOOD PRESSURE: 110 MMHG | TEMPERATURE: 97.9 F | HEART RATE: 110 BPM | RESPIRATION RATE: 14 BRPM | DIASTOLIC BLOOD PRESSURE: 80 MMHG | OXYGEN SATURATION: 94 %

## 2022-11-06 DIAGNOSIS — R50.9 FEVER, UNSPECIFIED FEVER CAUSE: ICD-10-CM

## 2022-11-06 DIAGNOSIS — J22 LRTI (LOWER RESPIRATORY TRACT INFECTION): ICD-10-CM

## 2022-11-06 DIAGNOSIS — A49.2 H. INFLUENZAE INFECTION: ICD-10-CM

## 2022-11-06 DIAGNOSIS — J03.90 EXUDATIVE TONSILLITIS: ICD-10-CM

## 2022-11-06 DIAGNOSIS — B37.0 THRUSH: ICD-10-CM

## 2022-11-06 PROCEDURE — 87070 CULTURE OTHR SPECIMN AEROBIC: CPT

## 2022-11-06 PROCEDURE — 71046 X-RAY EXAM CHEST 2 VIEWS: CPT | Mod: TC | Performed by: RADIOLOGY

## 2022-11-06 PROCEDURE — 99213 OFFICE O/P EST LOW 20 MIN: CPT | Performed by: NURSE PRACTITIONER

## 2022-11-06 PROCEDURE — 87077 CULTURE AEROBIC IDENTIFY: CPT

## 2022-11-06 PROCEDURE — 0240U HCHG SARS-COV-2 COVID-19 NFCT DS RESP RNA 3 TRGT MIC: CPT

## 2022-11-06 RX ORDER — CLINDAMYCIN HYDROCHLORIDE 300 MG/1
300 CAPSULE ORAL 3 TIMES DAILY
Qty: 30 CAPSULE | Refills: 0 | Status: SHIPPED | OUTPATIENT
Start: 2022-11-06 | End: 2022-11-16

## 2022-11-06 RX ORDER — DEXAMETHASONE SODIUM PHOSPHATE 10 MG/ML
10 INJECTION INTRAMUSCULAR; INTRAVENOUS ONCE
Status: COMPLETED | OUTPATIENT
Start: 2022-11-06 | End: 2022-11-06

## 2022-11-06 RX ADMIN — DEXAMETHASONE SODIUM PHOSPHATE 10 MG: 10 INJECTION INTRAMUSCULAR; INTRAVENOUS at 20:46

## 2022-11-06 ASSESSMENT — ENCOUNTER SYMPTOMS
COUGH: 1
SHORTNESS OF BREATH: 0
CHILLS: 1
SPUTUM PRODUCTION: 1
WHEEZING: 0
EYE REDNESS: 0
NAUSEA: 0
SORE THROAT: 1
VOMITING: 0
DIZZINESS: 0
RHINORRHEA: 1
HEADACHES: 1
MYALGIAS: 0
FEVER: 1

## 2022-11-06 ASSESSMENT — FIBROSIS 4 INDEX: FIB4 SCORE: 0.58

## 2022-11-07 DIAGNOSIS — J03.90 EXUDATIVE TONSILLITIS: ICD-10-CM

## 2022-11-07 DIAGNOSIS — R50.9 FEVER, UNSPECIFIED FEVER CAUSE: ICD-10-CM

## 2022-11-07 NOTE — PROGRESS NOTES
Subjective:   Maame David is a 42 y.o. female who presents for Cough (X 3 days with congestion, sore throat, lung pain, body aches, fever and chills. )      URI   This is a new problem. Episode onset: 3 days; covid, influenza, strep testing negative in which she did this morning prior to arrival in clinic.  Denies sick contacts. The problem has been unchanged. The maximum temperature recorded prior to her arrival was 100.4 - 100.9 F. The fever has been present for 1 to 2 days. Associated symptoms include congestion, coughing, headaches, rhinorrhea and a sore throat. Pertinent negatives include no chest pain, dysuria, ear pain, nausea, plugged ear sensation, rash, vomiting or wheezing. She has tried acetaminophen for the symptoms. The treatment provided no relief.     Review of Systems   Constitutional:  Positive for chills, fever and malaise/fatigue.   HENT:  Positive for congestion, rhinorrhea and sore throat. Negative for ear pain.    Eyes:  Negative for redness.   Respiratory:  Positive for cough and sputum production (Bloody). Negative for shortness of breath and wheezing.    Cardiovascular:  Negative for chest pain.   Gastrointestinal:  Negative for nausea and vomiting.   Genitourinary:  Negative for dysuria.   Musculoskeletal:  Negative for myalgias.   Skin:  Negative for rash.   Neurological:  Positive for headaches. Negative for dizziness.     Medications:    clindamycin Caps  dexamethasone  nystatin Susp    Allergies: Morphine, Penicillins, and Promethazine hcl    Problem List: Maame David does not have any pertinent problems on file.    Surgical History:  Past Surgical History:   Procedure Laterality Date    MANDI BY LAPAROSCOPY N/A 10/1/2018    Procedure: MANDI BY LAPAROSCOPY;  Surgeon: Adal Villanueva M.D.;  Location: SURGERY [facility];  Service: General    VAGINAL HYSTERECTOMY SCOPE TOTAL  9/4/2018    Procedure: VAGINAL HYSTERECTOMY SCOPE TOTAL;  Surgeon: Atul Aguilera M.D.;   "Location: SURGERY SAME DAY [facility];  Service: Gynecology    SALPINGECTOMY Bilateral 9/4/2018    Procedure: SALPINGECTOMY;  Surgeon: Atul Aguilera M.D.;  Location: SURGERY SAME DAY [facility];  Service: Gynecology    ANTERIOR AND POSTERIOR REPAIR  9/4/2018    Procedure: ANTERIOR AND POSTERIOR REPAIR - PERINEOPLASTY;  Surgeon: Atul Aguilera M.D.;  Location: SURGERY SAME DAY [facility];  Service: Gynecology    ENTEROCELE REPAIR  9/4/2018    Procedure: ENTEROCELE REPAIR;  Surgeon: Atul Aguilera M.D.;  Location: SURGERY SAME DAY [facility];  Service: Gynecology    BLADDER SLING FEMALE  9/4/2018    Procedure: BLADDER SLING FEMALE - TOT;  Surgeon: Atul Aguilera M.D.;  Location: SURGERY SAME DAY [facility];  Service: Gynecology    VAGINAL SUSPENSION  9/4/2018    Procedure: VAGINAL SUSPENSION - SACROSPINOUS VAULT;  Surgeon: Atul Aguilera M.D.;  Location: SURGERY SAME DAY [facility];  Service: Gynecology    GYN SURGERY  2007    ablation    OTHER ORTHOPEDIC SURGERY Left     shoulder       Past Social Hx: Maame David  reports that she quit smoking about 13 years ago. Her smoking use included cigarettes. She has never used smokeless tobacco. She reports current alcohol use. She reports that she does not use drugs.     Past Family Hx:  Maame David family history includes Alcohol/Drug in her father; Diabetes in her father, paternal grandfather, and paternal grandmother; Hypertension in her mother.     Problem list, medications, and allergies reviewed by myself today in Epic.     Objective:     /80   Pulse (!) 110   Temp 36.6 °C (97.9 °F) (Temporal)   Resp 14   Ht 1.6 m (5' 3\")   Wt 89.4 kg (197 lb)   LMP 03/21/2018 (Approximate)   SpO2 94%   BMI 34.90 kg/m²     Physical Exam  Vitals and nursing note reviewed.   Constitutional:       General: She is not in acute distress.     Appearance: She is well-developed.   HENT:      Head: Normocephalic and atraumatic.      Right Ear: " Tympanic membrane and external ear normal.      Left Ear: Tympanic membrane and external ear normal.      Nose: Nose normal.      Right Sinus: No maxillary sinus tenderness or frontal sinus tenderness.      Left Sinus: No maxillary sinus tenderness or frontal sinus tenderness.      Mouth/Throat:      Mouth: Mucous membranes are moist.      Pharynx: Uvula midline. Posterior oropharyngeal erythema present.      Tonsils: Tonsillar exudate present. No tonsillar abscesses. 2+ on the right. 2+ on the left.        Comments: White adhesions of the tongue  Eyes:      General:         Right eye: No discharge.         Left eye: No discharge.      Conjunctiva/sclera: Conjunctivae normal.   Cardiovascular:      Rate and Rhythm: Normal rate.   Pulmonary:      Effort: Pulmonary effort is normal. No respiratory distress.      Breath sounds: Normal breath sounds.   Abdominal:      General: There is no distension.   Musculoskeletal:         General: Normal range of motion.   Lymphadenopathy:      Cervical: Cervical adenopathy present.   Skin:     General: Skin is warm and dry.   Neurological:      General: No focal deficit present.      Mental Status: She is alert and oriented to person, place, and time. Mental status is at baseline.      Gait: Gait (gait at baseline) normal.   Psychiatric:         Judgment: Judgment normal.       Assessment/Plan:     Diagnosis and associated orders:     1. Fever, unspecified fever cause  DX-CHEST-2 VIEWS    CoV-2 and Flu A/B by PCR (24 hour In-House): Collect NP swab in VTM      2. Exudative tonsillitis  clindamycin (CLEOCIN) 300 MG Cap    CULTURE THROAT    dexamethasone (DECADRON) injection (check route below) 10 mg      3. LRTI (lower respiratory tract infection)        4. Thrush  nystatin (MYCOSTATIN) 167818 UNIT/ML Suspension         Comments/MDM:     I independently reviewed the patient's imaging and agree with the interpretation of the radiologist.    No active disease.   PT on clinical  presentation has exudate on oropharynx and it's possible beyond the strep A testing that this could be a different type of strep (C/G) or A. haemolyticum , patient will be treated with clindamycin as she is allergic to penicillins will follow-up with throat culture.  Advised to continue supportive care with Tylenol and/or ibuprofen for fevers and discomfort. Increased fluids and electrolytes.   Differential diagnosis, natural history, supportive care, and indications for immediate follow-up discuss               Please note that this dictation was created using voice recognition software. I have made a reasonable attempt to correct obvious errors, but I expect that there are errors of grammar and possibly content that I did not discover before finalizing the note.    This note was electronically signed by Richie SINHA.

## 2022-11-08 LAB
FLUAV RNA SPEC QL NAA+PROBE: NEGATIVE
FLUBV RNA SPEC QL NAA+PROBE: NEGATIVE
SARS-COV-2 RNA RESP QL NAA+PROBE: NOTDETECTED
SPECIMEN SOURCE: NORMAL

## 2022-11-09 LAB
BACTERIA SPEC RESP CULT: ABNORMAL
BACTERIA SPEC RESP CULT: ABNORMAL
SIGNIFICANT IND 70042: ABNORMAL
SITE SITE: ABNORMAL
SOURCE SOURCE: ABNORMAL

## 2022-11-11 RX ORDER — CEFDINIR 300 MG/1
300 CAPSULE ORAL 2 TIMES DAILY
Qty: 20 CAPSULE | Refills: 0 | Status: SHIPPED | OUTPATIENT
Start: 2022-11-11 | End: 2022-11-21

## 2022-11-11 RX ORDER — LIDOCAINE HYDROCHLORIDE 20 MG/ML
15 SOLUTION OROPHARYNGEAL EVERY 4 HOURS PRN
Qty: 120 ML | Refills: 0 | Status: SHIPPED | OUTPATIENT
Start: 2022-11-11 | End: 2023-09-14

## 2022-11-22 DIAGNOSIS — R73.03 PREDIABETES: ICD-10-CM

## 2022-11-22 DIAGNOSIS — E66.9 OBESITY (BMI 30-39.9): ICD-10-CM

## 2022-11-22 NOTE — TELEPHONE ENCOUNTER
Received request via: PatientGood Morning, can I request a refill for   Mounjaro 5mg to be sent to the [facility] please.     Was the patient seen in the last year in this department? Yes    Does the patient have an active prescription (recently filled or refills available) for medication(s) requested? No    Does the patient have [facility] and need 100 day supply (blood pressure, diabetes and cholesterol meds only)? Patient does not have SCP

## 2022-11-29 DIAGNOSIS — A49.2 H. INFLUENZAE INFECTION: ICD-10-CM

## 2022-11-29 RX ORDER — CEFDINIR 300 MG/1
300 CAPSULE ORAL 2 TIMES DAILY
Qty: 20 CAPSULE | Refills: 0 | Status: SHIPPED | OUTPATIENT
Start: 2022-11-29 | End: 2022-12-09

## 2022-12-21 ENCOUNTER — HOSPITAL ENCOUNTER (OUTPATIENT)
Facility: MEDICAL CENTER | Age: 42
End: 2022-12-21
Attending: NURSE PRACTITIONER
Payer: COMMERCIAL

## 2022-12-21 DIAGNOSIS — U07.1 COVID-19: ICD-10-CM

## 2022-12-21 PROCEDURE — U0003 INFECTIOUS AGENT DETECTION BY NUCLEIC ACID (DNA OR RNA); SEVERE ACUTE RESPIRATORY SYNDROME CORONAVIRUS 2 (SARS-COV-2) (CORONAVIRUS DISEASE [COVID-19]), AMPLIFIED PROBE TECHNIQUE, MAKING USE OF HIGH THROUGHPUT TECHNOLOGIES AS DESCRIBED BY CMS-2020-01-R: HCPCS

## 2022-12-23 DIAGNOSIS — R05.1 ACUTE COUGH: ICD-10-CM

## 2022-12-24 DIAGNOSIS — R05.1 ACUTE COUGH: ICD-10-CM

## 2022-12-24 LAB
SARS-COV-2 RNA RESP QL NAA+PROBE: DETECTED
SPECIMEN SOURCE: ABNORMAL

## 2023-01-03 ENCOUNTER — PATIENT MESSAGE (OUTPATIENT)
Dept: MEDICAL GROUP | Facility: PHYSICIAN GROUP | Age: 43
End: 2023-01-03
Payer: COMMERCIAL

## 2023-01-03 RX ORDER — TIRZEPATIDE 7.5 MG/.5ML
7.5 INJECTION, SOLUTION SUBCUTANEOUS
Qty: 2 ML | Refills: 3 | Status: SHIPPED | OUTPATIENT
Start: 2023-01-03 | End: 2023-02-14 | Stop reason: SDUPTHER

## 2023-02-15 RX ORDER — TIRZEPATIDE 7.5 MG/.5ML
7.5 INJECTION, SOLUTION SUBCUTANEOUS
Qty: 2 ML | Refills: 3 | Status: SHIPPED | OUTPATIENT
Start: 2023-02-15 | End: 2023-03-21 | Stop reason: SDUPTHER

## 2023-03-21 RX ORDER — TIRZEPATIDE 7.5 MG/.5ML
7.5 INJECTION, SOLUTION SUBCUTANEOUS
Qty: 2 ML | Refills: 3 | Status: SHIPPED | OUTPATIENT
Start: 2023-03-21 | End: 2023-03-27

## 2023-03-27 RX ORDER — TIRZEPATIDE 7.5 MG/.5ML
7.5 INJECTION, SOLUTION SUBCUTANEOUS
Qty: 2 ML | Refills: 0 | Status: CANCELLED | OUTPATIENT
Start: 2023-03-27

## 2023-03-27 RX ORDER — TIRZEPATIDE 5 MG/.5ML
5 INJECTION, SOLUTION SUBCUTANEOUS
Qty: 2 ML | Refills: 3 | Status: SHIPPED | OUTPATIENT
Start: 2023-03-27 | End: 2023-04-11

## 2023-04-04 ENCOUNTER — RESEARCH ENCOUNTER (OUTPATIENT)
Dept: URGENT CARE | Facility: CLINIC | Age: 43
End: 2023-04-04
Payer: COMMERCIAL

## 2023-04-04 DIAGNOSIS — R39.9 LOWER URINARY TRACT SYMPTOMS (LUTS): ICD-10-CM

## 2023-04-04 DIAGNOSIS — Z00.6 RESEARCH STUDY PATIENT: ICD-10-CM

## 2023-04-04 RX ORDER — SULFAMETHOXAZOLE AND TRIMETHOPRIM 800; 160 MG/1; MG/1
1 TABLET ORAL 2 TIMES DAILY
Qty: 6 TABLET | Refills: 0 | Status: SHIPPED | OUTPATIENT
Start: 2023-04-04 | End: 2023-04-07

## 2023-04-04 RX ORDER — FLUCONAZOLE 150 MG/1
150 TABLET ORAL ONCE
Qty: 2 TABLET | Refills: 0 | Status: SHIPPED | OUTPATIENT
Start: 2023-04-04 | End: 2023-04-04

## 2023-04-10 ENCOUNTER — PATIENT MESSAGE (OUTPATIENT)
Dept: MEDICAL GROUP | Facility: PHYSICIAN GROUP | Age: 43
End: 2023-04-10
Payer: COMMERCIAL

## 2023-05-24 DIAGNOSIS — J02.0 STREP PHARYNGITIS: ICD-10-CM

## 2023-05-24 RX ORDER — DEXAMETHASONE SODIUM PHOSPHATE 10 MG/ML
10 INJECTION INTRAMUSCULAR; INTRAVENOUS ONCE
Status: SHIPPED | OUTPATIENT
Start: 2023-05-24 | End: 2023-05-27

## 2023-05-24 RX ORDER — AZITHROMYCIN 250 MG/1
TABLET, FILM COATED ORAL
Qty: 6 TABLET | Refills: 0 | Status: SHIPPED | OUTPATIENT
Start: 2023-05-24 | End: 2023-09-14

## 2023-05-24 NOTE — PROGRESS NOTES
1. Strep pharyngitis    - azithromycin (ZITHROMAX) 250 MG Tab; Take as directed on package. Dispense one package.  Dispense: 6 Tablet; Refill: 0

## 2023-05-29 LAB
APOB+LDLR+PCSK9 GENE MUT ANL BLD/T: NOT DETECTED
BRCA1+BRCA2 DEL+DUP + FULL MUT ANL BLD/T: NOT DETECTED
MLH1+MSH2+MSH6+PMS2 GN DEL+DUP+FUL M: NOT DETECTED

## 2023-06-01 ENCOUNTER — PATIENT MESSAGE (OUTPATIENT)
Dept: MEDICAL GROUP | Facility: PHYSICIAN GROUP | Age: 43
End: 2023-06-01
Payer: COMMERCIAL

## 2023-06-02 DIAGNOSIS — B02.9 HERPES ZOSTER WITHOUT COMPLICATION: ICD-10-CM

## 2023-06-02 RX ORDER — VALACYCLOVIR HYDROCHLORIDE 1 G/1
1000 TABLET, FILM COATED ORAL 3 TIMES DAILY
Qty: 21 TABLET | Refills: 0 | Status: SHIPPED | OUTPATIENT
Start: 2023-06-02 | End: 2023-06-09

## 2023-06-02 NOTE — PATIENT COMMUNICATION
Patient wrote My Chart Message requesting 5 mg mounjaro, she states she has hit her goal weight and would like to titrate for a couple months before going to 2.5

## 2023-06-08 DIAGNOSIS — B37.31 YEAST VAGINITIS: ICD-10-CM

## 2023-06-08 RX ORDER — FLUCONAZOLE 150 MG/1
TABLET ORAL
Qty: 2 TABLET | Refills: 0 | Status: SHIPPED | OUTPATIENT
Start: 2023-06-08 | End: 2023-07-10

## 2023-06-08 NOTE — PROGRESS NOTES
1. Yeast vaginitis    - fluconazole (DIFLUCAN) 150 MG tablet; Take one tablet orally for yeast infection, if symptoms persist, may repeat treatment after 72 hours.  Dispense: 2 Tablet; Refill: 0

## 2023-06-19 ENCOUNTER — PATIENT MESSAGE (OUTPATIENT)
Dept: MEDICAL GROUP | Facility: PHYSICIAN GROUP | Age: 43
End: 2023-06-19
Payer: COMMERCIAL

## 2023-06-19 DIAGNOSIS — R73.03 PREDIABETES: ICD-10-CM

## 2023-06-19 DIAGNOSIS — E66.9 OBESITY (BMI 30-39.9): ICD-10-CM

## 2023-06-28 ENCOUNTER — PATIENT MESSAGE (OUTPATIENT)
Dept: MEDICAL GROUP | Facility: PHYSICIAN GROUP | Age: 43
End: 2023-06-28
Payer: COMMERCIAL

## 2023-06-28 DIAGNOSIS — E66.9 OBESITY (BMI 30-39.9): ICD-10-CM

## 2023-06-28 DIAGNOSIS — R73.03 PREDIABETES: ICD-10-CM

## 2023-07-10 ENCOUNTER — HOSPITAL ENCOUNTER (OUTPATIENT)
Facility: MEDICAL CENTER | Age: 43
End: 2023-07-10
Attending: NURSE PRACTITIONER
Payer: COMMERCIAL

## 2023-07-10 DIAGNOSIS — N89.8 VAGINAL IRRITATION: ICD-10-CM

## 2023-07-10 PROCEDURE — 87480 CANDIDA DNA DIR PROBE: CPT

## 2023-07-10 PROCEDURE — 87510 GARDNER VAG DNA DIR PROBE: CPT

## 2023-07-10 PROCEDURE — 87660 TRICHOMONAS VAGIN DIR PROBE: CPT

## 2023-07-10 RX ORDER — FLUCONAZOLE 150 MG/1
TABLET ORAL
Qty: 2 TABLET | Refills: 0 | Status: SHIPPED | OUTPATIENT
Start: 2023-07-10 | End: 2023-09-14

## 2023-07-10 RX ORDER — METRONIDAZOLE 500 MG/1
500 TABLET ORAL 2 TIMES DAILY
Qty: 14 TABLET | Refills: 0 | Status: SHIPPED | OUTPATIENT
Start: 2023-07-10 | End: 2023-07-17

## 2023-07-11 LAB
CANDIDA DNA VAG QL PROBE+SIG AMP: NEGATIVE
G VAGINALIS DNA VAG QL PROBE+SIG AMP: NEGATIVE
T VAGINALIS DNA VAG QL PROBE+SIG AMP: NEGATIVE

## 2023-08-11 DIAGNOSIS — N76.0 ACUTE VAGINITIS: ICD-10-CM

## 2023-08-11 DIAGNOSIS — Z86.19 HISTORY OF CANDIDAL VULVOVAGINITIS: ICD-10-CM

## 2023-08-11 RX ORDER — FLUCONAZOLE 150 MG/1
150 TABLET ORAL
Qty: 2 TABLET | Refills: 0 | Status: SHIPPED | OUTPATIENT
Start: 2023-08-11 | End: 2023-08-11 | Stop reason: SDUPTHER

## 2023-08-11 RX ORDER — FLUCONAZOLE 150 MG/1
150 TABLET ORAL
Qty: 2 TABLET | Refills: 0 | Status: SHIPPED | OUTPATIENT
Start: 2023-08-11 | End: 2023-09-14

## 2023-08-11 NOTE — PROGRESS NOTES
Patient with chief complaint of vaginal itching and irritation.  Reports symptoms started after swimming in the river.  Reports history of yeast infections after swimming.  Recently treated with Diflucan.  Rx sent for Diflucan.  Monitor.  Return precautions advised.

## 2023-09-14 ENCOUNTER — OFFICE VISIT (OUTPATIENT)
Dept: URGENT CARE | Facility: PHYSICIAN GROUP | Age: 43
End: 2023-09-14
Payer: COMMERCIAL

## 2023-09-14 VITALS
RESPIRATION RATE: 16 BRPM | HEIGHT: 63 IN | WEIGHT: 168 LBS | TEMPERATURE: 98.1 F | OXYGEN SATURATION: 97 % | HEART RATE: 86 BPM | DIASTOLIC BLOOD PRESSURE: 62 MMHG | BODY MASS INDEX: 29.77 KG/M2 | SYSTOLIC BLOOD PRESSURE: 104 MMHG

## 2023-09-14 DIAGNOSIS — K08.89 PAIN, DENTAL: ICD-10-CM

## 2023-09-14 DIAGNOSIS — K04.7 DENTAL INFECTION: ICD-10-CM

## 2023-09-14 DIAGNOSIS — K04.7 TOOTH ABSCESS: ICD-10-CM

## 2023-09-14 PROCEDURE — 3078F DIAST BP <80 MM HG: CPT | Performed by: PHYSICIAN ASSISTANT

## 2023-09-14 PROCEDURE — 99213 OFFICE O/P EST LOW 20 MIN: CPT | Performed by: PHYSICIAN ASSISTANT

## 2023-09-14 PROCEDURE — 3074F SYST BP LT 130 MM HG: CPT | Performed by: PHYSICIAN ASSISTANT

## 2023-09-14 RX ORDER — CLINDAMYCIN HYDROCHLORIDE 300 MG/1
300 CAPSULE ORAL 3 TIMES DAILY
Qty: 21 CAPSULE | Refills: 0 | Status: SHIPPED | OUTPATIENT
Start: 2023-09-14 | End: 2023-09-21

## 2023-09-14 RX ORDER — KETOROLAC TROMETHAMINE 30 MG/ML
30 INJECTION, SOLUTION INTRAMUSCULAR; INTRAVENOUS ONCE
Status: COMPLETED | OUTPATIENT
Start: 2023-09-14 | End: 2023-09-14

## 2023-09-14 RX ADMIN — KETOROLAC TROMETHAMINE 30 MG: 30 INJECTION, SOLUTION INTRAMUSCULAR; INTRAVENOUS at 09:05

## 2023-09-14 ASSESSMENT — FIBROSIS 4 INDEX: FIB4 SCORE: 0.59

## 2023-09-14 NOTE — PROGRESS NOTES
"Subjective:   Maame David is a 43 y.o. female who presents for Dental Pain (On (R) side with abscess x 6 months with a lot of pain x 1 day)      HPI  The patient presents to the Urgent Care with complaints of dental pain and possible dental abscess to her right lower molars.  Onset of pain yesterday.  History of dental abscess and infection.  She has a bridge dental work that area.  No fevers or chills.  Pain radiation to her right jaw.        Past Medical History:   Diagnosis Date    Dental disorder 08/21/2018    upper and lower    Endometriosis     Head ache     Migraine     Urinary incontinence 08/21/2018    occasionally     Allergies   Allergen Reactions    Morphine Unspecified     Flushing and felt hot    Penicillins Anaphylaxis     Stopped breathing when 7 yo  Tolerates Keflex, Tolerates Ceftriaxone    Promethazine Hcl Unspecified     \"mini seizures\"        Objective:     /62 (BP Location: Left arm, Patient Position: Sitting, BP Cuff Size: Adult long)   Pulse 86   Temp 36.7 °C (98.1 °F) (Temporal)   Resp 16   Ht 1.6 m (5' 3\")   Wt 76.2 kg (168 lb)   LMP 03/21/2018 (Approximate)   SpO2 97%   BMI 29.76 kg/m²     Physical Exam  Vitals reviewed.   Constitutional:       General: She is not in acute distress.     Appearance: Normal appearance. She is not ill-appearing or toxic-appearing.   HENT:      Head:      Jaw: Tenderness present. No trismus or swelling.        Mouth/Throat:      Mouth: Mucous membranes are moist.      Dentition: Dental tenderness, gingival swelling and dental abscesses (very small) present.      Pharynx: Oropharynx is clear.     Eyes:      Conjunctiva/sclera: Conjunctivae normal.   Cardiovascular:      Rate and Rhythm: Normal rate.   Pulmonary:      Effort: Pulmonary effort is normal.   Musculoskeletal:      Cervical back: Neck supple.   Skin:     General: Skin is warm and dry.   Neurological:      General: No focal deficit present.      Mental Status: She is alert and " oriented to person, place, and time.   Psychiatric:         Mood and Affect: Mood normal.         Behavior: Behavior normal.         Diagnosis and associated orders:     1. Tooth abscess  - clindamycin (CLEOCIN) 300 MG Cap; Take 1 Capsule by mouth 3 times a day for 7 days.  Dispense: 21 Capsule; Refill: 0    2. Dental infection  - clindamycin (CLEOCIN) 300 MG Cap; Take 1 Capsule by mouth 3 times a day for 7 days.  Dispense: 21 Capsule; Refill: 0  - ketorolac (Toradol) injection 30 mg    3. Pain, dental       Comments/MDM:     Start clindamycin.  Will treat with Toradol 30 mg IM here in the clinic.  Avoid ibuprofen for the rest of the day today.  May take extra strength Tylenol.  Dental hygiene.  Follow-up with a dentist.       I personally reviewed prior external notes and test results pertinent to today's visit. Pathogenesis of diagnosis discussed including typical length and natural progression. Supportive care, natural history, differential diagnoses, and indications for immediate follow-up discussed. Patient expresses understanding and agrees to plan. Patient denies any other questions or concerns.     Follow-up with the primary care physician for recheck, reevaluation, and consideration of further management.    Please note that this dictation was created using voice recognition software. I have made a reasonable attempt to correct obvious errors, but I expect that there are errors of grammar and possibly content that I did not discover before finalizing the note.    This note was electronically signed by Ilan Castle PA-C

## 2023-09-18 DIAGNOSIS — B02.9 HERPES ZOSTER WITHOUT COMPLICATION: ICD-10-CM

## 2023-09-18 RX ORDER — VALACYCLOVIR HYDROCHLORIDE 1 G/1
1000 TABLET, FILM COATED ORAL 3 TIMES DAILY
Qty: 21 TABLET | Refills: 0 | Status: SHIPPED | OUTPATIENT
Start: 2023-09-18 | End: 2023-09-25

## 2023-09-19 RX ORDER — CEPHALEXIN 500 MG/1
500 CAPSULE ORAL 3 TIMES DAILY
Qty: 21 CAPSULE | Refills: 0 | Status: SHIPPED | OUTPATIENT
Start: 2023-09-19 | End: 2023-11-05 | Stop reason: SDUPTHER

## 2023-11-04 ENCOUNTER — TELEMEDICINE (OUTPATIENT)
Dept: URGENT CARE | Facility: CLINIC | Age: 43
End: 2023-11-04
Payer: COMMERCIAL

## 2023-11-04 VITALS — RESPIRATION RATE: 16 BRPM | HEIGHT: 63 IN | BODY MASS INDEX: 29.77 KG/M2 | WEIGHT: 168 LBS

## 2023-11-04 DIAGNOSIS — K04.7 TOOTH ABSCESS: ICD-10-CM

## 2023-11-04 DIAGNOSIS — Z86.19 HISTORY OF CANDIDAL VULVOVAGINITIS: ICD-10-CM

## 2023-11-04 DIAGNOSIS — K04.7 DENTAL INFECTION: ICD-10-CM

## 2023-11-04 PROCEDURE — 99214 OFFICE O/P EST MOD 30 MIN: CPT | Mod: 95

## 2023-11-04 RX ORDER — CLINDAMYCIN HYDROCHLORIDE 300 MG/1
300 CAPSULE ORAL 3 TIMES DAILY
Qty: 21 CAPSULE | Refills: 0 | Status: SHIPPED | OUTPATIENT
Start: 2023-11-04 | End: 2023-11-05

## 2023-11-04 RX ORDER — FLUCONAZOLE 150 MG/1
TABLET ORAL
Qty: 2 TABLET | Refills: 0 | Status: SHIPPED | OUTPATIENT
Start: 2023-11-04 | End: 2023-12-18

## 2023-11-04 ASSESSMENT — FIBROSIS 4 INDEX: FIB4 SCORE: 0.59

## 2023-11-04 NOTE — PROGRESS NOTES
Virtual Visit: Established Patient   This visit was conducted via Zoom using secure and encrypted videoconferencing technology.   The patient was in their home in the [facility].    The patient's identity was confirmed and verbal consent was obtained for this virtual visit.    Subjective:   CC:   Chief Complaint   Patient presents with    Oral Swelling     Tooth infected X 4 days    Vaginitis     Yeast infection X 3 days      Maame David is a 43 y.o. female presenting for evaluation and management of:    Patient presents urgent care with concerns of dental pain and swelling of right lower molars.  This is a chronic issue for the patient. Onset of pain was 6 months ago.  She is established with dentist, had extensive dental work performed. Had dental bridge. Pain radiates to her jaw. Denies facial swelling.  Denies spontaneous drainage, bleeding   Denies fever or chills     ROS   As above    Current medicines (including changes today)  Current Outpatient Medications   Medication Sig Dispense Refill    cephALEXin (KEFLEX) 500 MG Cap Take 1 Capsule by mouth 3 times a day for 7 days. 21 Capsule 0    fluconazole (DIFLUCAN) 150 MG tablet Take one tablet orally for yeast infection, if symptoms persist, may repeat treatment after 72 hours. 2 Tablet 0    MOUNJARO 7.5 MG/0.5ML Solution Pen-injector INJECT 7.5 MG SUBCUTANEOUSLY WEEKLY 2 mL 3     No current facility-administered medications for this visit.       Patient Active Problem List    Diagnosis Date Noted    Prediabetes 10/26/2022    Obesity (BMI 30-39.9) 10/07/2022    Diarrhea 10/07/2022    Post-cholecystectomy syndrome 10/07/2022    Myofascial muscle pain 10/07/2022    Nonintractable headache 10/07/2022    Urinary incontinence, mixed 05/09/2022    Urinary incontinence without sensory awareness 05/09/2022    History of suburethral sling procedure 05/09/2022    Dyspareunia in female 05/09/2022    Chronic female pelvic pain 05/09/2022    Abnormal defecation  "05/09/2022    Migraine without aura 06/27/2017    Gastroesophageal reflux disease without esophagitis 06/27/2017        Objective:   Resp 16   Ht 1.6 m (5' 3\")   Wt 76.2 kg (168 lb)   LMP 03/21/2018 (Approximate)   BMI 29.76 kg/m²     Physical Exam:  Constitutional: Alert, no distress, well-groomed.  Skin: No rashes in visible areas.  Eye: Round. Conjunctiva clear, lids normal. No icterus.   ENMT: Lips pink without lesions, good dentition, moist mucous membranes. Phonation normal.  Neck: No masses, no thyromegaly. Moves freely without pain.  Respiratory: Unlabored respiratory effort, no cough or audible wheeze  Psych: Alert and oriented x3, normal affect and mood.     Assessment and Plan:   The following treatment plan was discussed:     1. Dental infection  - cephALEXin (KEFLEX) 500 MG Cap; Take 1 Capsule by mouth 3 times a day for 7 days.  Dispense: 21 Capsule; Refill: 0    2. Tooth abscess  - cephALEXin (KEFLEX) 500 MG Cap; Take 1 Capsule by mouth 3 times a day for 7 days.  Dispense: 21 Capsule; Refill: 0    3. History of candidal vulvovaginitis  - fluconazole (DIFLUCAN) 150 MG tablet; Take one tablet orally for yeast infection, if symptoms persist, may repeat treatment after 72 hours.  Dispense: 2 Tablet; Refill: 0    Reports history of vulvovaginal candidiasis with antibiotic use. Diflucan ordered.    Follow-up: with dentist          "

## 2023-11-05 RX ORDER — CEPHALEXIN 500 MG/1
500 CAPSULE ORAL 3 TIMES DAILY
Qty: 21 CAPSULE | Refills: 0 | Status: SHIPPED | OUTPATIENT
Start: 2023-11-05 | End: 2023-11-12

## 2023-12-18 ENCOUNTER — HOSPITAL ENCOUNTER (OUTPATIENT)
Facility: MEDICAL CENTER | Age: 43
End: 2023-12-18
Attending: PHYSICIAN ASSISTANT
Payer: COMMERCIAL

## 2023-12-18 ENCOUNTER — OFFICE VISIT (OUTPATIENT)
Dept: URGENT CARE | Facility: CLINIC | Age: 43
End: 2023-12-18
Payer: COMMERCIAL

## 2023-12-18 VITALS
HEIGHT: 63 IN | WEIGHT: 167 LBS | BODY MASS INDEX: 29.59 KG/M2 | TEMPERATURE: 97.3 F | SYSTOLIC BLOOD PRESSURE: 120 MMHG | RESPIRATION RATE: 16 BRPM | DIASTOLIC BLOOD PRESSURE: 82 MMHG | OXYGEN SATURATION: 97 % | HEART RATE: 84 BPM

## 2023-12-18 DIAGNOSIS — R30.0 DYSURIA: ICD-10-CM

## 2023-12-18 DIAGNOSIS — N30.01 ACUTE CYSTITIS WITH HEMATURIA: ICD-10-CM

## 2023-12-18 LAB
APPEARANCE UR: NORMAL
BILIRUB UR STRIP-MCNC: NEGATIVE MG/DL
COLOR UR AUTO: NORMAL
GLUCOSE UR STRIP.AUTO-MCNC: NEGATIVE MG/DL
KETONES UR STRIP.AUTO-MCNC: NEGATIVE MG/DL
LEUKOCYTE ESTERASE UR QL STRIP.AUTO: NEGATIVE
NITRITE UR QL STRIP.AUTO: NEGATIVE
PH UR STRIP.AUTO: 7 [PH] (ref 5–8)
PROT UR QL STRIP: NORMAL MG/DL
RBC UR QL AUTO: NORMAL
SP GR UR STRIP.AUTO: 1.02
UROBILINOGEN UR STRIP-MCNC: NORMAL MG/DL

## 2023-12-18 PROCEDURE — 87510 GARDNER VAG DNA DIR PROBE: CPT

## 2023-12-18 PROCEDURE — 3079F DIAST BP 80-89 MM HG: CPT | Performed by: PHYSICIAN ASSISTANT

## 2023-12-18 PROCEDURE — 87480 CANDIDA DNA DIR PROBE: CPT

## 2023-12-18 PROCEDURE — 99214 OFFICE O/P EST MOD 30 MIN: CPT | Performed by: PHYSICIAN ASSISTANT

## 2023-12-18 PROCEDURE — 87086 URINE CULTURE/COLONY COUNT: CPT

## 2023-12-18 PROCEDURE — 87660 TRICHOMONAS VAGIN DIR PROBE: CPT

## 2023-12-18 PROCEDURE — 3074F SYST BP LT 130 MM HG: CPT | Performed by: PHYSICIAN ASSISTANT

## 2023-12-18 PROCEDURE — 87186 SC STD MICRODIL/AGAR DIL: CPT

## 2023-12-18 PROCEDURE — 81002 URINALYSIS NONAUTO W/O SCOPE: CPT | Performed by: PHYSICIAN ASSISTANT

## 2023-12-18 RX ORDER — FLUCONAZOLE 150 MG/1
150 TABLET ORAL DAILY
Qty: 1 TABLET | Refills: 0 | Status: SHIPPED | OUTPATIENT
Start: 2023-12-18 | End: 2024-01-07

## 2023-12-18 RX ORDER — NITROFURANTOIN 25; 75 MG/1; MG/1
100 CAPSULE ORAL 2 TIMES DAILY
Qty: 20 CAPSULE | Refills: 0 | Status: SHIPPED | OUTPATIENT
Start: 2023-12-18

## 2023-12-18 RX ORDER — METRONIDAZOLE 7.5 MG/G
1 GEL VAGINAL
Qty: 5 EACH | Refills: 0 | Status: SHIPPED | OUTPATIENT
Start: 2023-12-18 | End: 2023-12-23

## 2023-12-18 ASSESSMENT — FIBROSIS 4 INDEX: FIB4 SCORE: 0.59

## 2023-12-19 LAB
CANDIDA DNA VAG QL PROBE+SIG AMP: POSITIVE
G VAGINALIS DNA VAG QL PROBE+SIG AMP: NEGATIVE
T VAGINALIS DNA VAG QL PROBE+SIG AMP: NEGATIVE

## 2023-12-21 LAB
BACTERIA UR CULT: ABNORMAL
BACTERIA UR CULT: ABNORMAL
SIGNIFICANT IND 70042: ABNORMAL
SITE SITE: ABNORMAL
SOURCE SOURCE: ABNORMAL

## 2023-12-22 ENCOUNTER — OFFICE VISIT (OUTPATIENT)
Dept: MEDICAL GROUP | Facility: PHYSICIAN GROUP | Age: 43
End: 2023-12-22
Payer: COMMERCIAL

## 2023-12-22 VITALS
WEIGHT: 169 LBS | SYSTOLIC BLOOD PRESSURE: 126 MMHG | HEART RATE: 88 BPM | TEMPERATURE: 98.9 F | BODY MASS INDEX: 29.95 KG/M2 | RESPIRATION RATE: 20 BRPM | OXYGEN SATURATION: 96 % | HEIGHT: 63 IN | DIASTOLIC BLOOD PRESSURE: 64 MMHG

## 2023-12-22 DIAGNOSIS — E78.00 HYPERCHOLESTEROLEMIA: ICD-10-CM

## 2023-12-22 DIAGNOSIS — R73.03 PREDIABETES: ICD-10-CM

## 2023-12-22 DIAGNOSIS — E66.9 OBESITY (BMI 30-39.9): ICD-10-CM

## 2023-12-22 PROCEDURE — 99214 OFFICE O/P EST MOD 30 MIN: CPT | Performed by: FAMILY MEDICINE

## 2023-12-22 PROCEDURE — 3074F SYST BP LT 130 MM HG: CPT | Performed by: FAMILY MEDICINE

## 2023-12-22 PROCEDURE — 3078F DIAST BP <80 MM HG: CPT | Performed by: FAMILY MEDICINE

## 2023-12-22 RX ORDER — TIRZEPATIDE 2.5 MG/.5ML
2.5 INJECTION, SOLUTION SUBCUTANEOUS
Qty: 6 ML | Refills: 3 | Status: SHIPPED | OUTPATIENT
Start: 2023-12-22 | End: 2024-01-03

## 2023-12-22 ASSESSMENT — PATIENT HEALTH QUESTIONNAIRE - PHQ9: CLINICAL INTERPRETATION OF PHQ2 SCORE: 0

## 2023-12-22 ASSESSMENT — FIBROSIS 4 INDEX: FIB4 SCORE: 0.59

## 2023-12-22 NOTE — PROGRESS NOTES
"CHIEF COMPLAINT / REASON FOR VISIT  Maame David is a 43 y.o. female that presents today for follow up    HISTORY OF PRESENT ILLNESS  No new concerns, has done well with Mounjaro for weight loss, continues to exercise and focus on healthy diet    Would like to redo routine lab work    OBJECTIVE     /64 (BP Location: Right arm, Patient Position: Sitting, BP Cuff Size: Adult)   Pulse 88   Temp 37.2 °C (98.9 °F) (Temporal)   Resp 20   Ht 1.6 m (5' 3\")   Wt 76.7 kg (169 lb)   LMP 03/21/2018 (Approximate)   SpO2 96%   BMI 29.94 kg/m²      PHYSICAL EXAM  Constitutional: Sitting comfortably, in no acute distress, responds to questions appropriately  Skin: Warm and dry, no rashes or lesions on face or exposed upper extremities    ASSESSMENT & PLAN  1. Obesity (BMI 30-39.9)  Improved significantly with long-term treatment of tirzepatide, currently on 2.5 mg weekly.  She went from 197 pounds in November 2022 to 169 pounds currently.  She continues to exercise daily and is fairly disciplined about her eating habits.  Discussed that it is reasonable to continue tirzepatide 2.5 mg weekly.  May consider increasing dose in future if needed.  - Comp Metabolic Panel; Future  - CBC WITH DIFFERENTIAL; Future  - Tirzepatide-Weight Management (ZEPBOUND) 2.5 MG/0.5ML Solution Auto-injector; Inject 2.5 mg under the skin every 7 days.  Dispense: 6 mL; Refill: 3  - TSH WITH REFLEX TO FT4; Future    2. Hypercholesterolemia  Mild hypercholesterolemia on last lab draw, obtain repeat lipids  - Lipid Profile; Future    3. Prediabetes  - Comp Metabolic Panel; Future  - HEMOGLOBIN A1C; Future    Will inform patient of results via MyChart    "

## 2023-12-24 ENCOUNTER — OFFICE VISIT (OUTPATIENT)
Dept: URGENT CARE | Facility: CLINIC | Age: 43
End: 2023-12-24
Payer: COMMERCIAL

## 2023-12-24 VITALS
RESPIRATION RATE: 12 BRPM | SYSTOLIC BLOOD PRESSURE: 118 MMHG | DIASTOLIC BLOOD PRESSURE: 60 MMHG | TEMPERATURE: 100.1 F | HEIGHT: 63 IN | BODY MASS INDEX: 29.95 KG/M2 | WEIGHT: 169 LBS | HEART RATE: 80 BPM | OXYGEN SATURATION: 95 %

## 2023-12-24 DIAGNOSIS — R05.1 ACUTE COUGH: ICD-10-CM

## 2023-12-24 DIAGNOSIS — J10.1 INFLUENZA A: ICD-10-CM

## 2023-12-24 LAB
FLUAV RNA SPEC QL NAA+PROBE: POSITIVE
FLUBV RNA SPEC QL NAA+PROBE: NEGATIVE
RSV RNA SPEC QL NAA+PROBE: NEGATIVE
SARS-COV-2 RNA RESP QL NAA+PROBE: NEGATIVE

## 2023-12-24 PROCEDURE — 3078F DIAST BP <80 MM HG: CPT | Performed by: NURSE PRACTITIONER

## 2023-12-24 PROCEDURE — 0241U POCT CEPHEID COV-2, FLU A/B, RSV - PCR: CPT | Performed by: NURSE PRACTITIONER

## 2023-12-24 PROCEDURE — 99213 OFFICE O/P EST LOW 20 MIN: CPT | Performed by: NURSE PRACTITIONER

## 2023-12-24 PROCEDURE — 3074F SYST BP LT 130 MM HG: CPT | Performed by: NURSE PRACTITIONER

## 2023-12-24 RX ORDER — OSELTAMIVIR PHOSPHATE 75 MG/1
75 CAPSULE ORAL 2 TIMES DAILY
Qty: 10 CAPSULE | Refills: 0
Start: 2023-12-24 | End: 2023-12-29

## 2023-12-24 RX ORDER — OSELTAMIVIR PHOSPHATE 75 MG/1
75 CAPSULE ORAL 2 TIMES DAILY
Qty: 10 CAPSULE | Refills: 0 | Status: SHIPPED | OUTPATIENT
Start: 2023-12-24 | End: 2023-12-29

## 2023-12-24 ASSESSMENT — ENCOUNTER SYMPTOMS
COUGH: 1
DIARRHEA: 1
WEAKNESS: 0
FEVER: 1
MYALGIAS: 1
SENSORY CHANGE: 0
SHORTNESS OF BREATH: 0
HEADACHES: 1
CHILLS: 1

## 2023-12-24 ASSESSMENT — FIBROSIS 4 INDEX: FIB4 SCORE: 0.59

## 2023-12-24 ASSESSMENT — VISUAL ACUITY: OU: 1

## 2023-12-25 NOTE — PROGRESS NOTES
Today, patient started to experience diarrhea, body aches, fever, chills, cough, and headache.  Patient positive for influenza A.  Advised supportive measures and OTC medications.  Prescription for Tamiflu sent electronically to pharmacy.  Monitor symptoms.  Return precautions advised.

## 2023-12-25 NOTE — PROGRESS NOTES
Subjective:     Maame David is a 43 y.o. female who presents for Cough, Fever, and Nasal Congestion       Cough  This is a new problem. The current episode started today. The problem has been gradually worsening. Associated symptoms include chills, a fever, headaches and myalgias. Pertinent negatives include no shortness of breath. Treatments tried: DayQuil.   Fever   This is a new problem. Associated symptoms include congestion, coughing, diarrhea and headaches.     Today, patient started to experience diarrhea, body aches, fever, chills, cough, and headache.     Review of Systems   Constitutional:  Positive for chills and fever.   HENT:  Positive for congestion.    Respiratory:  Positive for cough. Negative for shortness of breath.    Gastrointestinal:  Positive for diarrhea.   Musculoskeletal:  Positive for myalgias.   Neurological:  Positive for headaches. Negative for sensory change and weakness.   All other systems reviewed and are negative.    Refer to HPI for additional details.    During this visit, appropriate PPE was worn, and hand hygiene was performed.    PMH:  has a past medical history of Dental disorder (08/21/2018), Endometriosis, Head ache, Migraine, and Urinary incontinence (08/21/2018).    MEDS:   Current Outpatient Medications:     oseltamivir (TAMIFLU) 75 MG Cap, Take 1 Capsule by mouth 2 times a day for 5 days., Disp: 10 Capsule, Rfl: 0    oseltamivir (TAMIFLU) 75 MG Cap, Take 1 Capsule by mouth 2 times a day for 5 days., Disp: 10 Capsule, Rfl: 0    Tirzepatide-Weight Management (ZEPBOUND) 2.5 MG/0.5ML Solution Auto-injector, Inject 2.5 mg under the skin every 7 days., Disp: 6 mL, Rfl: 3    nitrofurantoin (MACROBID) 100 MG Cap, Take 1 Capsule by mouth 2 times a day., Disp: 20 Capsule, Rfl: 0    fluconazole (DIFLUCAN) 150 MG tablet, Take 1 Tablet by mouth every day., Disp: 1 Tablet, Rfl: 0    ALLERGIES:   Allergies   Allergen Reactions    Morphine Unspecified     Flushing and felt hot     "Penicillins Anaphylaxis     Stopped breathing when 7 yo  Tolerates Keflex, Tolerates Ceftriaxone    Promethazine Hcl Unspecified     \"mini seizures\"     SURGHX:   Past Surgical History:   Procedure Laterality Date    MANDI BY LAPAROSCOPY N/A 10/1/2018    Procedure: MANDI BY LAPAROSCOPY;  Surgeon: Adal Villanueva M.D.;  Location: SURGERY [facility];  Service: General    VAGINAL HYSTERECTOMY SCOPE TOTAL  9/4/2018    Procedure: VAGINAL HYSTERECTOMY SCOPE TOTAL;  Surgeon: Atul Aguilera M.D.;  Location: SURGERY SAME DAY [facility];  Service: Gynecology    SALPINGECTOMY Bilateral 9/4/2018    Procedure: SALPINGECTOMY;  Surgeon: Atul Aguilera M.D.;  Location: SURGERY SAME DAY [facility];  Service: Gynecology    ANTERIOR AND POSTERIOR REPAIR  9/4/2018    Procedure: ANTERIOR AND POSTERIOR REPAIR - PERINEOPLASTY;  Surgeon: Atul Aguilera M.D.;  Location: SURGERY SAME DAY [facility];  Service: Gynecology    ENTEROCELE REPAIR  9/4/2018    Procedure: ENTEROCELE REPAIR;  Surgeon: Atul Aguilera M.D.;  Location: SURGERY SAME DAY [facility];  Service: Gynecology    BLADDER SLING FEMALE  9/4/2018    Procedure: BLADDER SLING FEMALE - TOT;  Surgeon: Atul Aguilera M.D.;  Location: SURGERY SAME DAY [facility];  Service: Gynecology    VAGINAL SUSPENSION  9/4/2018    Procedure: VAGINAL SUSPENSION - SACROSPINOUS VAULT;  Surgeon: Atul Aguilera M.D.;  Location: SURGERY SAME DAY [facility];  Service: Gynecology    GYN SURGERY  2007    ablation    OTHER ORTHOPEDIC SURGERY Left     shoulder     SOCHX:  reports that she quit smoking about 14 years ago. Her smoking use included cigarettes. She started smoking about 16 years ago. She has never used smokeless tobacco. She reports current alcohol use. She reports that she does not use drugs.    FH: Per HPI as applicable/pertinent.      Objective:     /60 (BP Location: Right arm, Patient Position: Sitting)   Pulse 80   Temp 36.3 °C (97.4 °F) (Temporal)   Resp " "12   Ht 1.6 m (5' 3\")   Wt 76.7 kg (169 lb)   LMP 03/21/2018 (Approximate)   SpO2 95%   BMI 29.94 kg/m²     Physical Exam  Nursing note reviewed.   Constitutional:       General: She is not in acute distress.     Appearance: She is well-developed. She is not toxic-appearing.   Eyes:      General: Vision grossly intact.      Extraocular Movements: Extraocular movements intact.   Cardiovascular:      Rate and Rhythm: Normal rate and regular rhythm.      Heart sounds: Normal heart sounds.   Pulmonary:      Effort: Pulmonary effort is normal. No respiratory distress.      Breath sounds: Normal breath sounds. No stridor. No decreased breath sounds, wheezing, rhonchi or rales.   Musculoskeletal:         General: No deformity. Normal range of motion.      Cervical back: Normal range of motion.   Skin:     General: Skin is warm and dry.      Coloration: Skin is not pale.   Neurological:      Mental Status: She is alert and oriented to person, place, and time.      Motor: No weakness.   Psychiatric:         Behavior: Behavior normal. Behavior is cooperative.     POCT Cepheid CoV-2, Flu A/B, RSV by PCR: positive flu A      Assessment/Plan:     1. Acute cough  - POCT CEPHEID COV-2, FLU A/B, RSV - PCR    2. Influenza A  - oseltamivir (TAMIFLU) 75 MG Cap; Take 1 Capsule by mouth 2 times a day for 5 days.  Dispense: 10 Capsule; Refill: 0    Discussed likely self-limiting viral etiology and expected course and duration of illness. Vital signs stable, afebrile, no acute distress at this time.    Emphasize supportive measures, rest, fluids, and OTC medication PRN such as ibuprofen. Rx as above sent electronically.     Standard precautions/mask/wash hands.    Monitor. Return precautions advised.     Differential diagnosis, natural history, supportive care, over-the-counter symptom management per 's instructions, close monitoring, and indications for immediate follow-up discussed.     All questions answered. Patient " agrees with the plan of care.    Discharge summary provided via [facility].

## 2023-12-28 ASSESSMENT — ENCOUNTER SYMPTOMS
VOMITING: 0
FEVER: 0
NAUSEA: 0

## 2023-12-28 NOTE — PROGRESS NOTES
"Subjective     Maame David is a 43 y.o. female who presents with UTI (Extremely painful urination, blood in urine X today )            Patient presents with:  UTI: Extremely painful urination, blood in urine X today. No fever, chills, n/v/d.  No history of kidney stones.  No new partner, no concern for STI.  PT has taken otc nsaids with  no relief , symptoms continue to worsen.  No other complaint.         UTI  This is a new problem. The current episode started today. The problem occurs constantly. The problem has been rapidly worsening. Associated symptoms include urinary symptoms. Pertinent negatives include no fever, nausea, rash or vomiting. Exacerbated by: urination. She has tried drinking and NSAIDs for the symptoms. The treatment provided no relief.       Review of Systems   Constitutional:  Negative for fever.   Gastrointestinal:  Negative for nausea and vomiting.   Genitourinary:  Positive for dysuria, frequency, hematuria and urgency.   Skin:  Negative for rash.   All other systems reviewed and are negative.             Objective     /82 (BP Location: Right arm, Patient Position: Standing, BP Cuff Size: Adult)   Pulse 84   Temp 36.3 °C (97.3 °F) (Temporal)   Resp 16   Ht 1.6 m (5' 3\")   Wt 75.8 kg (167 lb)   LMP 03/21/2018 (Approximate)   SpO2 97%   BMI 29.58 kg/m²      Physical Exam  Vitals and nursing note reviewed.   Constitutional:       General: She is not in acute distress.     Appearance: Normal appearance. She is well-developed and normal weight. She is not ill-appearing or toxic-appearing.   HENT:      Head: Normocephalic and atraumatic.      Nose: Nose normal.      Mouth/Throat:      Mouth: Mucous membranes are moist.   Eyes:      Extraocular Movements: Extraocular movements intact.      Conjunctiva/sclera: Conjunctivae normal.      Pupils: Pupils are equal, round, and reactive to light.   Cardiovascular:      Rate and Rhythm: Normal rate and regular rhythm.      Pulses: Normal " pulses.      Heart sounds: Normal heart sounds.   Pulmonary:      Effort: Pulmonary effort is normal.      Breath sounds: Normal breath sounds.   Abdominal:      General: Bowel sounds are normal.      Palpations: Abdomen is soft.      Tenderness: There is no guarding or rebound.   Musculoskeletal:         General: Normal range of motion.      Cervical back: Normal range of motion and neck supple.   Skin:     General: Skin is warm and dry.      Capillary Refill: Capillary refill takes less than 2 seconds.   Neurological:      General: No focal deficit present.      Mental Status: She is alert and oriented to person, place, and time.      Gait: Gait normal.   Psychiatric:         Mood and Affect: Mood normal.         Behavior: Behavior is cooperative.                             Assessment & Plan             1. Acute cystitis with hematuria  POCT Urinalysis    URINE CULTURE(NEW)    VAGINAL PATHOGENS DNA PANEL    nitrofurantoin (MACROBID) 100 MG Cap    metroNIDAZOLE (METROGEL-VAGINAL) 0.75 % Gel    fluconazole (DIFLUCAN) 150 MG tablet    cefTRIAXone (Rocephin) 500 mg in lidocaine (Xylocaine) 1 % 2 mL for IM use      2. Dysuria  POCT Urinalysis    URINE CULTURE(NEW)    VAGINAL PATHOGENS DNA PANEL    nitrofurantoin (MACROBID) 100 MG Cap    metroNIDAZOLE (METROGEL-VAGINAL) 0.75 % Gel    fluconazole (DIFLUCAN) 150 MG tablet    cefTRIAXone (Rocephin) 500 mg in lidocaine (Xylocaine) 1 % 2 mL for IM use        UA: + large blood , turbid, protein  Self swab sent to lab, will treat based on results.     Urine culture sent to lab, will call with any necessary treatment or treatment changes.      PT symptoms are significant with rapid onset, IM rocephin in clinic and Rx abx sent to pharmacy for UTI.      Differential diagnosis, supportive care, and indications for immediate follow-up discussed with patient.  Instructed to return to clinic or nearest emergency department for any change in condition, further concerns, or worsening  of symptoms.    I personally reviewed prior external notes and test results pertinent to today's visit.  I have independently reviewed and interpreted all diagnostics ordered during this urgent care visit.    PT should follow up with PCP in 1-2 days for re-evaluation if symptoms have not improved.      Discussed red flags and reasons to return to UC or ED.      Pt and/or family verbalized understanding of diagnosis and follow up instructions and was offered informational handout on diagnosis.  PT discharged.     Please note that this dictation was created using voice recognition software. I have made every reasonable attempt to correct obvious errors, but I expect that there may be errors of grammar and possibly content that I did not discover before finalizing the note.

## 2023-12-31 ENCOUNTER — PATIENT MESSAGE (OUTPATIENT)
Dept: MEDICAL GROUP | Facility: PHYSICIAN GROUP | Age: 43
End: 2023-12-31
Payer: COMMERCIAL

## 2023-12-31 DIAGNOSIS — E66.9 OBESITY (BMI 30-39.9): ICD-10-CM

## 2024-01-03 RX ORDER — TIRZEPATIDE 5 MG/.5ML
5 INJECTION, SOLUTION SUBCUTANEOUS
Qty: 6 ML | Refills: 3 | Status: SHIPPED | OUTPATIENT
Start: 2024-01-03 | End: 2024-02-08 | Stop reason: SDUPTHER

## 2024-01-07 ENCOUNTER — OFFICE VISIT (OUTPATIENT)
Dept: URGENT CARE | Facility: PHYSICIAN GROUP | Age: 44
End: 2024-01-07
Payer: COMMERCIAL

## 2024-01-07 VITALS
DIASTOLIC BLOOD PRESSURE: 78 MMHG | WEIGHT: 171 LBS | TEMPERATURE: 97.4 F | BODY MASS INDEX: 30.3 KG/M2 | RESPIRATION RATE: 16 BRPM | SYSTOLIC BLOOD PRESSURE: 106 MMHG | OXYGEN SATURATION: 97 % | HEIGHT: 63 IN | HEART RATE: 72 BPM

## 2024-01-07 DIAGNOSIS — B02.9 HERPES ZOSTER WITHOUT COMPLICATION: ICD-10-CM

## 2024-01-07 DIAGNOSIS — K04.7 DENTAL INFECTION: ICD-10-CM

## 2024-01-07 DIAGNOSIS — T36.95XA ANTIBIOTIC-INDUCED YEAST INFECTION: ICD-10-CM

## 2024-01-07 DIAGNOSIS — B37.9 ANTIBIOTIC-INDUCED YEAST INFECTION: ICD-10-CM

## 2024-01-07 PROCEDURE — 3074F SYST BP LT 130 MM HG: CPT

## 2024-01-07 PROCEDURE — 3078F DIAST BP <80 MM HG: CPT

## 2024-01-07 PROCEDURE — 99213 OFFICE O/P EST LOW 20 MIN: CPT

## 2024-01-07 RX ORDER — VALACYCLOVIR HYDROCHLORIDE 1 G/1
1000 TABLET, FILM COATED ORAL 3 TIMES DAILY
Qty: 21 TABLET | Refills: 0 | Status: SHIPPED | OUTPATIENT
Start: 2024-01-07 | End: 2024-01-14

## 2024-01-07 RX ORDER — FLUCONAZOLE 150 MG/1
150 TABLET ORAL DAILY
Qty: 1 TABLET | Refills: 1 | Status: SHIPPED | OUTPATIENT
Start: 2024-01-07 | End: 2024-01-07

## 2024-01-07 RX ORDER — FLUCONAZOLE 150 MG/1
150 TABLET ORAL DAILY
Qty: 1 TABLET | Refills: 1 | Status: SHIPPED | OUTPATIENT
Start: 2024-01-07 | End: 2024-01-23 | Stop reason: SDUPTHER

## 2024-01-07 RX ORDER — CEFDINIR 300 MG/1
300 CAPSULE ORAL 2 TIMES DAILY
Qty: 14 CAPSULE | Refills: 0 | Status: SHIPPED | OUTPATIENT
Start: 2024-01-07 | End: 2024-01-14

## 2024-01-07 RX ORDER — VALACYCLOVIR HYDROCHLORIDE 1 G/1
1000 TABLET, FILM COATED ORAL 3 TIMES DAILY
Qty: 21 TABLET | Refills: 0 | Status: SHIPPED | OUTPATIENT
Start: 2024-01-07 | End: 2024-01-07

## 2024-01-07 RX ORDER — CEFDINIR 300 MG/1
300 CAPSULE ORAL 2 TIMES DAILY
Qty: 14 CAPSULE | Refills: 0 | Status: SHIPPED | OUTPATIENT
Start: 2024-01-07 | End: 2024-01-07

## 2024-01-07 ASSESSMENT — FIBROSIS 4 INDEX: FIB4 SCORE: 0.59

## 2024-01-07 ASSESSMENT — ENCOUNTER SYMPTOMS: FEVER: 0

## 2024-01-07 NOTE — PROGRESS NOTES
Subjective:   Maame David is a 43 y.o. female who presents for No chief complaint on file.      HPI:   #1 rash: Patient reports developing painful rash to the posterior aspect of her left thigh yesterday.  She has not take anything for her symptoms.  She does report history of shingles with same presentation.  She denies fevers, chills, body aches.    #2 dental infection: Patient reports history of dental abscess and problems to her right lower lower molars.  She has been taking clindamycin over the last 4 days without any improvement in her symptoms.  She reports pain is moderate to severe.  She does have orthodontic procedure scheduled for next month.    #3 antibiotic induced yeast infection: Patient reports history of antibiotic induced yeast infection.  She has developed symptoms over the last few days while taking clindamycin.    Review of Systems   Constitutional:  Negative for fever.   HENT:          + Dental pain   Skin:  Positive for rash.       Medications:    Current Outpatient Medications on File Prior to Visit   Medication Sig Dispense Refill    Tirzepatide (MOUNJARO) 5 MG/0.5ML Solution Pen-injector Inject 5 mg under the skin every 7 days. 6 mL 3    nitrofurantoin (MACROBID) 100 MG Cap Take 1 Capsule by mouth 2 times a day. 20 Capsule 0     No current facility-administered medications on file prior to visit.        Allergies:   Morphine, Penicillins, and Promethazine hcl    Problem List:   Patient Active Problem List   Diagnosis    Migraine without aura    Gastroesophageal reflux disease without esophagitis    Urinary incontinence, mixed    Urinary incontinence without sensory awareness    History of suburethral sling procedure    Dyspareunia in female    Chronic female pelvic pain    Abnormal defecation    Obesity (BMI 30-39.9)    Diarrhea    Post-cholecystectomy syndrome    Myofascial muscle pain    Nonintractable headache    Prediabetes        Surgical History:  Past Surgical History:    Procedure Laterality Date    MANDI BY LAPAROSCOPY N/A 10/1/2018    Procedure: MANDI BY LAPAROSCOPY;  Surgeon: Adal Villanueva M.D.;  Location: SURGERY [facility];  Service: General    VAGINAL HYSTERECTOMY SCOPE TOTAL  2018    Procedure: VAGINAL HYSTERECTOMY SCOPE TOTAL;  Surgeon: Atul Aguilera M.D.;  Location: SURGERY SAME DAY [facility];  Service: Gynecology    SALPINGECTOMY Bilateral 2018    Procedure: SALPINGECTOMY;  Surgeon: Atul Aguilera M.D.;  Location: SURGERY SAME DAY [facility];  Service: Gynecology    ANTERIOR AND POSTERIOR REPAIR  2018    Procedure: ANTERIOR AND POSTERIOR REPAIR - PERINEOPLASTY;  Surgeon: Atul Aguilera M.D.;  Location: SURGERY SAME DAY [facility];  Service: Gynecology    ENTEROCELE REPAIR  2018    Procedure: ENTEROCELE REPAIR;  Surgeon: Atul Aguilera M.D.;  Location: SURGERY SAME DAY [facility];  Service: Gynecology    BLADDER SLING FEMALE  2018    Procedure: BLADDER SLING FEMALE - TOT;  Surgeon: Atul Aguilera M.D.;  Location: SURGERY SAME DAY [facility];  Service: Gynecology    VAGINAL SUSPENSION  2018    Procedure: VAGINAL SUSPENSION - SACROSPINOUS VAULT;  Surgeon: Atul Aguilera M.D.;  Location: SURGERY SAME DAY [facility];  Service: Gynecology    GYN SURGERY      ablation    OTHER ORTHOPEDIC SURGERY Left     shoulder       Past Social Hx:   Social History     Tobacco Use    Smoking status: Former     Current packs/day: 0.00     Types: Cigarettes     Start date: 2007     Quit date: 2009     Years since quittin.9    Smokeless tobacco: Never   Vaping Use    Vaping Use: Never used   Substance Use Topics    Alcohol use: Yes     Comment: 1-2 per month socially    Drug use: No          Problem list, medications, and allergies reviewed by myself today in Epic.     Objective:     /78 (BP Location: Right arm, Patient Position: Sitting, BP Cuff Size: Adult)   Pulse 72   Temp 36.3 °C (97.4 °F) (Temporal)    "Resp 16   Ht 1.6 m (5' 3\")   Wt 77.6 kg (171 lb)   LMP 03/21/2018 (Approximate)   SpO2 97%   BMI 30.29 kg/m²     Physical Exam  Vitals and nursing note reviewed.   Constitutional:       General: She is not in acute distress.     Appearance: Normal appearance. She is normal weight. She is not ill-appearing or toxic-appearing.   HENT:      Head: Normocephalic and atraumatic.      Mouth/Throat:      Mouth: Mucous membranes are moist.      Dentition: Dental tenderness and dental abscesses present.      Pharynx: Oropharynx is clear. No oropharyngeal exudate or posterior oropharyngeal erythema.     Cardiovascular:      Rate and Rhythm: Normal rate.   Pulmonary:      Effort: Pulmonary effort is normal.   Skin:     General: Skin is warm and dry.      Capillary Refill: Capillary refill takes less than 2 seconds.      Findings: Rash present. Rash is vesicular.             Comments: vesicular rash with erythematous base to posterior aspect of left thigh   Neurological:      General: No focal deficit present.      Mental Status: She is alert and oriented to person, place, and time. Mental status is at baseline.   Psychiatric:         Mood and Affect: Mood normal.         Behavior: Behavior normal.         Thought Content: Thought content normal.         Judgment: Judgment normal.         Assessment/Plan:     Diagnosis and associated orders:   1. Antibiotic-induced yeast infection  - fluconazole (DIFLUCAN) 150 MG tablet; Take 1 Tablet by mouth every day.  Dispense: 1 Tablet; Refill: 1    2. Herpes zoster without complication  - valacyclovir (VALTREX) 1 GM Tab; Take 1 Tablet by mouth 3 times a day for 7 days.  Dispense: 21 Tablet; Refill: 0    3. Dental infection  - cefdinir (OMNICEF) 300 MG Cap; Take 1 Capsule by mouth 2 times a day for 7 days.  Dispense: 14 Capsule; Refill: 0        Comments/MDM:   Pt is clinically stable at today's acute urgent care visit.  No acute distress noted. Appropriate for outpatient management at " this time.     Acute problem  Discontinue clindamycin, start cefdinir for dental infection as prescribed  Follow-up with orthodontic as scheduled  Alternate Tylenol ibuprofen as needed for pain  Valtrex as prescribed for shingles rash  Diflucan as prescribed           Discussed DDx, management options (risks,benefits, and alternatives to planned treatment), natural progression and supportive care.  Expressed understanding and the treatment plan was agreed upon. Questions were encouraged and answered   Return to urgent care prn if new or worsening sx or if there is no improvement in condition prn.    Educated in Red flags and indications to immediately call 911 or present to the Emergency Department.   Advised the patient to follow-up with the primary care physician for recheck, reevaluation, and consideration of further management.    I personally reviewed prior external notes and test results pertinent to today's visit.  I have independently reviewed and interpreted all diagnostics ordered during this urgent care acute visit.       Please note that this dictation was created using voice recognition software. I have made a reasonable attempt to correct obvious errors, but I expect that there are errors of grammar and possibly content that I did not discover before finalizing the note.    This note was electronically signed by BHARATH Carver

## 2024-01-17 DIAGNOSIS — B37.9 CANDIDIASIS: ICD-10-CM

## 2024-01-17 RX ORDER — FLUCONAZOLE 150 MG/1
150 TABLET ORAL DAILY
Qty: 3 TABLET | Refills: 0 | Status: SHIPPED | OUTPATIENT
Start: 2024-01-17 | End: 2024-01-20

## 2024-01-23 DIAGNOSIS — T36.95XA ANTIBIOTIC-INDUCED YEAST INFECTION: ICD-10-CM

## 2024-01-23 DIAGNOSIS — B37.9 ANTIBIOTIC-INDUCED YEAST INFECTION: ICD-10-CM

## 2024-01-23 RX ORDER — FLUCONAZOLE 150 MG/1
150 TABLET ORAL DAILY
Qty: 3 TABLET | Refills: 0 | Status: SHIPPED | OUTPATIENT
Start: 2024-01-23 | End: 2024-01-26

## 2024-02-08 ENCOUNTER — PATIENT MESSAGE (OUTPATIENT)
Dept: MEDICAL GROUP | Facility: PHYSICIAN GROUP | Age: 44
End: 2024-02-08
Payer: COMMERCIAL

## 2024-02-08 DIAGNOSIS — E66.9 OBESITY (BMI 30-39.9): ICD-10-CM

## 2024-02-08 DIAGNOSIS — H02.729 HYPOTRICHOSIS OF EYELID, UNSPECIFIED LATERALITY: ICD-10-CM

## 2024-02-08 RX ORDER — BIMATOPROST 3 UG/ML
SOLUTION TOPICAL
Qty: 5 ML | Refills: 3 | Status: SHIPPED | OUTPATIENT
Start: 2024-02-08

## 2024-02-08 RX ORDER — TIRZEPATIDE 5 MG/.5ML
5 INJECTION, SOLUTION SUBCUTANEOUS
Qty: 6 ML | Refills: 3 | Status: SHIPPED | OUTPATIENT
Start: 2024-02-08 | End: 2024-02-12 | Stop reason: SDUPTHER

## 2024-02-12 ENCOUNTER — PATIENT MESSAGE (OUTPATIENT)
Dept: MEDICAL GROUP | Facility: PHYSICIAN GROUP | Age: 44
End: 2024-02-12
Payer: COMMERCIAL

## 2024-02-12 DIAGNOSIS — E66.9 OBESITY (BMI 30-39.9): ICD-10-CM

## 2024-02-12 RX ORDER — TIRZEPATIDE 5 MG/.5ML
5 INJECTION, SOLUTION SUBCUTANEOUS
Qty: 6 ML | Refills: 3 | Status: SHIPPED | OUTPATIENT
Start: 2024-02-12 | End: 2024-02-12

## 2024-02-12 RX ORDER — TIRZEPATIDE 5 MG/.5ML
5 INJECTION, SOLUTION SUBCUTANEOUS
Qty: 6 ML | Refills: 3 | Status: SHIPPED | OUTPATIENT
Start: 2024-02-12 | End: 2024-03-05 | Stop reason: SDUPTHER

## 2024-02-12 RX ORDER — TIRZEPATIDE 5 MG/.5ML
5 INJECTION, SOLUTION SUBCUTANEOUS
Qty: 6 ML | Refills: 3 | Status: SHIPPED | OUTPATIENT
Start: 2024-02-12 | End: 2024-02-12 | Stop reason: SDUPTHER

## 2024-02-22 DIAGNOSIS — J10.1 INFLUENZA B: ICD-10-CM

## 2024-02-22 RX ORDER — OSELTAMIVIR PHOSPHATE 75 MG/1
75 CAPSULE ORAL 2 TIMES DAILY
Qty: 10 CAPSULE | Refills: 0 | Status: SHIPPED | OUTPATIENT
Start: 2024-02-22 | End: 2024-02-22 | Stop reason: SDUPTHER

## 2024-02-22 RX ORDER — OSELTAMIVIR PHOSPHATE 75 MG/1
75 CAPSULE ORAL 2 TIMES DAILY
Qty: 10 CAPSULE | Refills: 0 | Status: SHIPPED | OUTPATIENT
Start: 2024-02-22

## 2024-02-25 ENCOUNTER — HOSPITAL ENCOUNTER (OUTPATIENT)
Facility: MEDICAL CENTER | Age: 44
End: 2024-02-25
Payer: COMMERCIAL

## 2024-02-25 ENCOUNTER — OFFICE VISIT (OUTPATIENT)
Dept: URGENT CARE | Facility: CLINIC | Age: 44
End: 2024-02-25
Payer: COMMERCIAL

## 2024-02-25 VITALS
TEMPERATURE: 97.3 F | SYSTOLIC BLOOD PRESSURE: 132 MMHG | OXYGEN SATURATION: 98 % | DIASTOLIC BLOOD PRESSURE: 74 MMHG | BODY MASS INDEX: 30.3 KG/M2 | HEART RATE: 88 BPM | WEIGHT: 171 LBS | HEIGHT: 63 IN | RESPIRATION RATE: 13 BRPM

## 2024-02-25 DIAGNOSIS — J10.1 INFLUENZA B: ICD-10-CM

## 2024-02-25 DIAGNOSIS — R11.0 NAUSEA: ICD-10-CM

## 2024-02-25 DIAGNOSIS — R10.9 FLANK PAIN: ICD-10-CM

## 2024-02-25 LAB
ALBUMIN SERPL BCP-MCNC: 4 G/DL (ref 3.2–4.9)
ALBUMIN/GLOB SERPL: 1 G/DL
ALP SERPL-CCNC: 62 U/L (ref 30–99)
ALT SERPL-CCNC: 14 U/L (ref 2–50)
ANION GAP SERPL CALC-SCNC: 11 MMOL/L (ref 7–16)
APPEARANCE UR: CLEAR
AST SERPL-CCNC: 20 U/L (ref 12–45)
BASOPHILS # BLD AUTO: 0.5 % (ref 0–1.8)
BASOPHILS # BLD: 0.02 K/UL (ref 0–0.12)
BILIRUB SERPL-MCNC: 0.2 MG/DL (ref 0.1–1.5)
BILIRUB UR STRIP-MCNC: NEGATIVE MG/DL
BUN SERPL-MCNC: 8 MG/DL (ref 8–22)
CALCIUM ALBUM COR SERPL-MCNC: 9.1 MG/DL (ref 8.5–10.5)
CALCIUM SERPL-MCNC: 9.1 MG/DL (ref 8.5–10.5)
CHLORIDE SERPL-SCNC: 103 MMOL/L (ref 96–112)
CO2 SERPL-SCNC: 23 MMOL/L (ref 20–33)
COLOR UR AUTO: YELLOW
CREAT SERPL-MCNC: 0.56 MG/DL (ref 0.5–1.4)
EOSINOPHIL # BLD AUTO: 0.06 K/UL (ref 0–0.51)
EOSINOPHIL NFR BLD: 1.4 % (ref 0–6.9)
ERYTHROCYTE [DISTWIDTH] IN BLOOD BY AUTOMATED COUNT: 42.3 FL (ref 35.9–50)
GFR SERPLBLD CREATININE-BSD FMLA CKD-EPI: 117 ML/MIN/1.73 M 2
GLOBULIN SER CALC-MCNC: 4 G/DL (ref 1.9–3.5)
GLUCOSE SERPL-MCNC: 94 MG/DL (ref 65–99)
GLUCOSE UR STRIP.AUTO-MCNC: NEGATIVE MG/DL
HCT VFR BLD AUTO: 46 % (ref 37–47)
HGB BLD-MCNC: 15.2 G/DL (ref 12–16)
IMM GRANULOCYTES # BLD AUTO: 0.01 K/UL (ref 0–0.11)
IMM GRANULOCYTES NFR BLD AUTO: 0.2 % (ref 0–0.9)
KETONES UR STRIP.AUTO-MCNC: NEGATIVE MG/DL
LEUKOCYTE ESTERASE UR QL STRIP.AUTO: NEGATIVE
LYMPHOCYTES # BLD AUTO: 1.63 K/UL (ref 1–4.8)
LYMPHOCYTES NFR BLD: 36.9 % (ref 22–41)
MCH RBC QN AUTO: 29.6 PG (ref 27–33)
MCHC RBC AUTO-ENTMCNC: 33 G/DL (ref 32.2–35.5)
MCV RBC AUTO: 89.7 FL (ref 81.4–97.8)
MONOCYTES # BLD AUTO: 0.44 K/UL (ref 0–0.85)
MONOCYTES NFR BLD AUTO: 10 % (ref 0–13.4)
NEUTROPHILS # BLD AUTO: 2.26 K/UL (ref 1.82–7.42)
NEUTROPHILS NFR BLD: 51 % (ref 44–72)
NITRITE UR QL STRIP.AUTO: NEGATIVE
NRBC # BLD AUTO: 0 K/UL
NRBC BLD-RTO: 0 /100 WBC (ref 0–0.2)
PH UR STRIP.AUTO: 7 [PH] (ref 5–8)
PLATELET # BLD AUTO: 198 K/UL (ref 164–446)
PMV BLD AUTO: 11.4 FL (ref 9–12.9)
POTASSIUM SERPL-SCNC: 3.9 MMOL/L (ref 3.6–5.5)
PROT SERPL-MCNC: 8 G/DL (ref 6–8.2)
PROT UR QL STRIP: NEGATIVE MG/DL
RBC # BLD AUTO: 5.13 M/UL (ref 4.2–5.4)
RBC UR QL AUTO: NEGATIVE
SODIUM SERPL-SCNC: 137 MMOL/L (ref 135–145)
SP GR UR STRIP.AUTO: 1.01
UROBILINOGEN UR STRIP-MCNC: 0.2 MG/DL
WBC # BLD AUTO: 4.4 K/UL (ref 4.8–10.8)

## 2024-02-25 PROCEDURE — 3078F DIAST BP <80 MM HG: CPT

## 2024-02-25 PROCEDURE — 3075F SYST BP GE 130 - 139MM HG: CPT

## 2024-02-25 PROCEDURE — 81002 URINALYSIS NONAUTO W/O SCOPE: CPT

## 2024-02-25 PROCEDURE — 80053 COMPREHEN METABOLIC PANEL: CPT

## 2024-02-25 PROCEDURE — 85025 COMPLETE CBC W/AUTO DIFF WBC: CPT

## 2024-02-25 PROCEDURE — 99213 OFFICE O/P EST LOW 20 MIN: CPT

## 2024-02-25 RX ORDER — ONDANSETRON 4 MG/1
4 TABLET, ORALLY DISINTEGRATING ORAL EVERY 6 HOURS PRN
Qty: 15 TABLET | Refills: 0 | Status: SHIPPED | OUTPATIENT
Start: 2024-02-25

## 2024-02-25 RX ORDER — IBUPROFEN 800 MG/1
800 TABLET ORAL EVERY 8 HOURS PRN
COMMUNITY

## 2024-02-25 ASSESSMENT — ENCOUNTER SYMPTOMS: FLANK PAIN: 1

## 2024-02-25 ASSESSMENT — FIBROSIS 4 INDEX: FIB4 SCORE: 0.61

## 2024-02-25 NOTE — PROGRESS NOTES
Subjective:   Maame David is a 44 y.o. female who presents for Influenza (+flu B Thursday night. cough, chills, body aches, diarrhea, b/L flank pain, symptoms worsen. )      HPI: This is a 44-year-old female who presents today for bilateral flank pain, body aches, cough, nausea.  Patient reports severe flank pain developed last night.  She reports recently tested positive for influenza B on 2\22.  She has been on Tamiflu, taking DayQuil, NyQuil, and ibuprofen 800 mg around-the-clock.  She reports nausea without emesis.  She has had decreased appetite.  She denies any additional urinary symptoms.      Review of Systems   Genitourinary:  Positive for flank pain. Negative for dysuria, frequency, hematuria and urgency.       Medications:    Current Outpatient Medications on File Prior to Visit   Medication Sig Dispense Refill    ibuprofen (MOTRIN) 800 MG Tab Take 800 mg by mouth every 8 hours as needed.      oseltamivir (TAMIFLU) 75 MG Cap Take 1 Capsule by mouth 2 times a day. 10 Capsule 0    Tirzepatide (MOUNJARO) 5 MG/0.5ML Solution Pen-injector Inject 5 mg under the skin every 7 days. 6 mL 3    Bimatoprost 0.03 % Solution 1 drop applied evenly on the skin of the upper eyelid at base of eyelashes once daily at bedtime; repeat procedure for second eye. (Patient not taking: Reported on 2/25/2024) 5 mL 3    nitrofurantoin (MACROBID) 100 MG Cap Take 1 Capsule by mouth 2 times a day. (Patient not taking: Reported on 2/25/2024) 20 Capsule 0     No current facility-administered medications on file prior to visit.        Allergies:   Morphine, Penicillins, and Promethazine hcl    Problem List:   Patient Active Problem List   Diagnosis    Migraine without aura    Gastroesophageal reflux disease without esophagitis    Urinary incontinence, mixed    Urinary incontinence without sensory awareness    History of suburethral sling procedure    Dyspareunia in female    Chronic female pelvic pain    Abnormal defecation     Obesity (BMI 30-39.9)    Diarrhea    Post-cholecystectomy syndrome    Myofascial muscle pain    Nonintractable headache    Prediabetes        Surgical History:  Past Surgical History:   Procedure Laterality Date    MANDI BY LAPAROSCOPY N/A 10/1/2018    Procedure: MANDI BY LAPAROSCOPY;  Surgeon: Adal Villanueva M.D.;  Location: SURGERY [facility];  Service: General    VAGINAL HYSTERECTOMY SCOPE TOTAL  9/4/2018    Procedure: VAGINAL HYSTERECTOMY SCOPE TOTAL;  Surgeon: Atul Aguilera M.D.;  Location: SURGERY SAME DAY [facility];  Service: Gynecology    SALPINGECTOMY Bilateral 9/4/2018    Procedure: SALPINGECTOMY;  Surgeon: Atul Aguilera M.D.;  Location: SURGERY SAME DAY [facility];  Service: Gynecology    ANTERIOR AND POSTERIOR REPAIR  9/4/2018    Procedure: ANTERIOR AND POSTERIOR REPAIR - PERINEOPLASTY;  Surgeon: Atul Aguilera M.D.;  Location: SURGERY SAME DAY [facility];  Service: Gynecology    ENTEROCELE REPAIR  9/4/2018    Procedure: ENTEROCELE REPAIR;  Surgeon: Atul Aguilera M.D.;  Location: SURGERY SAME DAY [facility];  Service: Gynecology    BLADDER SLING FEMALE  9/4/2018    Procedure: BLADDER SLING FEMALE - TOT;  Surgeon: Atul Aguilera M.D.;  Location: SURGERY SAME DAY [facility];  Service: Gynecology    VAGINAL SUSPENSION  9/4/2018    Procedure: VAGINAL SUSPENSION - SACROSPINOUS VAULT;  Surgeon: Atul Aguilera M.D.;  Location: SURGERY SAME DAY [facility];  Service: Gynecology    GYN SURGERY  2007    ablation    OTHER ORTHOPEDIC SURGERY Left     shoulder       Past Social Hx:   Social History     Tobacco Use    Smoking status: Former     Current packs/day: 0.00     Types: Cigarettes     Start date: 1/21/2007     Quit date: 1/21/2009     Years since quitting: 15.1    Smokeless tobacco: Never   Vaping Use    Vaping Use: Never used   Substance Use Topics    Alcohol use: Yes     Comment: 1-2 per month socially    Drug use: No          Problem list, medications, and allergies  "reviewed by myself today in Epic.     Objective:     /74   Pulse 88   Temp 36.3 °C (97.3 °F) (Temporal)   Resp 13   Ht 1.6 m (5' 3\")   Wt 77.6 kg (171 lb)   LMP 03/21/2018 (Approximate)   SpO2 98%   BMI 30.29 kg/m²     Physical Exam  Vitals and nursing note reviewed.   Constitutional:       General: She is not in acute distress.     Appearance: Normal appearance. She is normal weight. She is ill-appearing. She is not toxic-appearing or diaphoretic.   HENT:      Head: Normocephalic and atraumatic.      Mouth/Throat:      Mouth: Mucous membranes are moist.      Pharynx: Oropharynx is clear. No oropharyngeal exudate or posterior oropharyngeal erythema.   Cardiovascular:      Rate and Rhythm: Normal rate and regular rhythm.      Pulses: Normal pulses.      Heart sounds: Normal heart sounds. No murmur heard.     No friction rub. No gallop.   Pulmonary:      Effort: Pulmonary effort is normal. No respiratory distress.      Breath sounds: Normal breath sounds. No stridor. No wheezing, rhonchi or rales.   Chest:      Chest wall: No tenderness.   Abdominal:      General: Abdomen is flat. Bowel sounds are normal.      Palpations: Abdomen is soft.      Tenderness: There is right CVA tenderness and left CVA tenderness.   Musculoskeletal:      Cervical back: Neck supple. No tenderness.   Lymphadenopathy:      Cervical: No cervical adenopathy.   Skin:     General: Skin is warm and dry.      Capillary Refill: Capillary refill takes less than 2 seconds.   Neurological:      General: No focal deficit present.      Mental Status: She is alert and oriented to person, place, and time. Mental status is at baseline.      Cranial Nerves: No cranial nerve deficit.      Motor: No weakness.      Gait: Gait normal.   Psychiatric:         Mood and Affect: Mood normal.         Behavior: Behavior normal.         Thought Content: Thought content normal.         Judgment: Judgment normal.         Assessment/Plan:     Diagnosis and " associated orders:   1. Flank pain  POCT Urinalysis    CBC WITH DIFFERENTIAL    Comp Metabolic Panel      2. Influenza B        3. Nausea  ondansetron (ZOFRAN ODT) 4 MG TABLET DISPERSIBLE         Results for orders placed or performed in visit on 02/25/24   POCT Urinalysis   Result Value Ref Range    POC Color yellow Negative    POC Appearance clear Negative    POC Glucose negative Negative mg/dL    POC Bilirubin negative Negative mg/dL    POC Ketones negative Negative mg/dL    POC Specific Gravity 1.015 <1.005 - >1.030    POC Blood negative Negative    POC Urine PH 7.0 5.0 - 8.0    POC Protein negative Negative mg/dL    POC Urobiligen 0.2 Negative (0.2) mg/dL    POC Nitrites negative Negative    POC Leukocyte Esterase negative Negative          Comments/MDM:   Pt is clinically stable at today's acute urgent care visit.  No acute distress noted. Appropriate for outpatient management at this time.     Acute problem.  Patient presents today for bilateral flank pain.  She is without any additional urinary symptoms.  She recently tested positive for influenza B.  She is currently taking Tamiflu.  POC UA is unremarkable.  CBC and CHEM panel obtained today and is also unremarkable.  I do believe that symptoms are secondary to influenza infection.  I have discussed continued Tylenol and ibuprofen for body aches.  Patient will be prescribed Zofran as needed for nausea.  Further discussed staying well-hydrated.  She is to return for any worsening signs or symptoms or symptoms of fail to improve.  Patient is agreeable to plan of care verbalizes good understanding.         Discussed DDx, management options (risks,benefits, and alternatives to planned treatment), natural progression and supportive care.  Expressed understanding and the treatment plan was agreed upon. Questions were encouraged and answered   Return to urgent care prn if new or worsening sx or if there is no improvement in condition prn.    Educated in Red flags  and indications to immediately call 911 or present to the Emergency Department.   Advised the patient to follow-up with the primary care physician for recheck, reevaluation, and consideration of further management.    I personally reviewed prior external notes and test results pertinent to today's visit.  I have independently reviewed and interpreted all diagnostics ordered during this urgent care acute visit.       Please note that this dictation was created using voice recognition software. I have made a reasonable attempt to correct obvious errors, but I expect that there are errors of grammar and possibly content that I did not discover before finalizing the note.    This note was electronically signed by BHARATH Carver

## 2024-02-27 DIAGNOSIS — B37.0 THRUSH: ICD-10-CM

## 2024-03-05 DIAGNOSIS — E66.9 OBESITY (BMI 30-39.9): ICD-10-CM

## 2024-03-05 NOTE — TELEPHONE ENCOUNTER
Received request via: Pharmacy    Was the patient seen in the last year in this department? Yes    Does the patient have an active prescription (recently filled or refills available) for medication(s) requested? No    [facility]    Does the patient have longterm Plus and need 100 day supply (blood pressure, diabetes and cholesterol meds only)? Patient does not have SCP

## 2024-03-07 RX ORDER — TIRZEPATIDE 5 MG/.5ML
5 INJECTION, SOLUTION SUBCUTANEOUS
Qty: 6 ML | Refills: 3 | Status: SHIPPED | OUTPATIENT
Start: 2024-03-07

## 2024-03-10 DIAGNOSIS — B02.9 HERPES ZOSTER WITHOUT COMPLICATION: ICD-10-CM

## 2024-03-10 DIAGNOSIS — B37.9 YEAST INFECTION: ICD-10-CM

## 2024-03-10 RX ORDER — FLUCONAZOLE 150 MG/1
150 TABLET ORAL DAILY
Qty: 2 TABLET | Refills: 0 | Status: SHIPPED | OUTPATIENT
Start: 2024-03-10

## 2024-03-10 RX ORDER — VALACYCLOVIR HYDROCHLORIDE 1 G/1
1000 TABLET, FILM COATED ORAL 3 TIMES DAILY
Qty: 21 TABLET | Refills: 0 | Status: SHIPPED | OUTPATIENT
Start: 2024-03-10 | End: 2024-03-17

## 2024-03-27 ENCOUNTER — TELEPHONE (OUTPATIENT)
Dept: MEDICAL GROUP | Facility: PHYSICIAN GROUP | Age: 44
End: 2024-03-27

## 2024-04-25 ENCOUNTER — PATIENT MESSAGE (OUTPATIENT)
Dept: MEDICAL GROUP | Facility: PHYSICIAN GROUP | Age: 44
End: 2024-04-25
Payer: COMMERCIAL

## 2024-04-25 DIAGNOSIS — E66.9 OBESITY (BMI 30-39.9): ICD-10-CM

## 2024-04-26 RX ORDER — TIRZEPATIDE 2.5 MG/.5ML
1 INJECTION, SOLUTION SUBCUTANEOUS
Qty: 4 ML | Refills: 0 | Status: SHIPPED | OUTPATIENT
Start: 2024-04-26 | End: 2024-04-30 | Stop reason: SDUPTHER

## 2024-04-26 RX ORDER — TIRZEPATIDE 2.5 MG/.5ML
1 INJECTION, SOLUTION SUBCUTANEOUS
Qty: 4 ML | Refills: 0 | Status: SHIPPED | OUTPATIENT
Start: 2024-04-26 | End: 2024-04-26 | Stop reason: SDUPTHER

## 2024-04-29 ENCOUNTER — HOSPITAL ENCOUNTER (OUTPATIENT)
Dept: LAB | Facility: MEDICAL CENTER | Age: 44
End: 2024-04-29
Attending: FAMILY MEDICINE
Payer: COMMERCIAL

## 2024-04-29 DIAGNOSIS — E66.9 OBESITY (BMI 30-39.9): ICD-10-CM

## 2024-04-29 DIAGNOSIS — E78.00 HYPERCHOLESTEROLEMIA: ICD-10-CM

## 2024-04-29 DIAGNOSIS — R73.03 PREDIABETES: ICD-10-CM

## 2024-04-29 LAB
ALBUMIN SERPL BCP-MCNC: 4.5 G/DL (ref 3.2–4.9)
ALBUMIN/GLOB SERPL: 1.5 G/DL
ALP SERPL-CCNC: 66 U/L (ref 30–99)
ALT SERPL-CCNC: 18 U/L (ref 2–50)
ANION GAP SERPL CALC-SCNC: 11 MMOL/L (ref 7–16)
AST SERPL-CCNC: 18 U/L (ref 12–45)
BASOPHILS # BLD AUTO: 1 % (ref 0–1.8)
BASOPHILS # BLD: 0.07 K/UL (ref 0–0.12)
BILIRUB SERPL-MCNC: 0.7 MG/DL (ref 0.1–1.5)
BUN SERPL-MCNC: 13 MG/DL (ref 8–22)
CALCIUM ALBUM COR SERPL-MCNC: 8.9 MG/DL (ref 8.5–10.5)
CALCIUM SERPL-MCNC: 9.3 MG/DL (ref 8.5–10.5)
CHLORIDE SERPL-SCNC: 104 MMOL/L (ref 96–112)
CHOLEST SERPL-MCNC: 214 MG/DL (ref 100–199)
CO2 SERPL-SCNC: 26 MMOL/L (ref 20–33)
CREAT SERPL-MCNC: 0.54 MG/DL (ref 0.5–1.4)
EOSINOPHIL # BLD AUTO: 0.09 K/UL (ref 0–0.51)
EOSINOPHIL NFR BLD: 1.3 % (ref 0–6.9)
ERYTHROCYTE [DISTWIDTH] IN BLOOD BY AUTOMATED COUNT: 42.6 FL (ref 35.9–50)
EST. AVERAGE GLUCOSE BLD GHB EST-MCNC: 103 MG/DL
GFR SERPLBLD CREATININE-BSD FMLA CKD-EPI: 118 ML/MIN/1.73 M 2
GLOBULIN SER CALC-MCNC: 3.1 G/DL (ref 1.9–3.5)
GLUCOSE SERPL-MCNC: 95 MG/DL (ref 65–99)
HBA1C MFR BLD: 5.2 % (ref 4–5.6)
HCT VFR BLD AUTO: 44.4 % (ref 37–47)
HDLC SERPL-MCNC: 62 MG/DL
HGB BLD-MCNC: 14.8 G/DL (ref 12–16)
IMM GRANULOCYTES # BLD AUTO: 0.03 K/UL (ref 0–0.11)
IMM GRANULOCYTES NFR BLD AUTO: 0.4 % (ref 0–0.9)
LDLC SERPL CALC-MCNC: 139 MG/DL
LYMPHOCYTES # BLD AUTO: 2.68 K/UL (ref 1–4.8)
LYMPHOCYTES NFR BLD: 38.8 % (ref 22–41)
MCH RBC QN AUTO: 29.8 PG (ref 27–33)
MCHC RBC AUTO-ENTMCNC: 33.3 G/DL (ref 32.2–35.5)
MCV RBC AUTO: 89.5 FL (ref 81.4–97.8)
MONOCYTES # BLD AUTO: 0.38 K/UL (ref 0–0.85)
MONOCYTES NFR BLD AUTO: 5.5 % (ref 0–13.4)
NEUTROPHILS # BLD AUTO: 3.65 K/UL (ref 1.82–7.42)
NEUTROPHILS NFR BLD: 53 % (ref 44–72)
NRBC # BLD AUTO: 0 K/UL
NRBC BLD-RTO: 0 /100 WBC (ref 0–0.2)
PLATELET # BLD AUTO: 212 K/UL (ref 164–446)
PMV BLD AUTO: 11.5 FL (ref 9–12.9)
POTASSIUM SERPL-SCNC: 3.8 MMOL/L (ref 3.6–5.5)
PROT SERPL-MCNC: 7.6 G/DL (ref 6–8.2)
RBC # BLD AUTO: 4.96 M/UL (ref 4.2–5.4)
SODIUM SERPL-SCNC: 141 MMOL/L (ref 135–145)
TRIGL SERPL-MCNC: 66 MG/DL (ref 0–149)
TSH SERPL DL<=0.005 MIU/L-ACNC: 2.65 UIU/ML (ref 0.38–5.33)
WBC # BLD AUTO: 6.9 K/UL (ref 4.8–10.8)

## 2024-04-29 PROCEDURE — 80053 COMPREHEN METABOLIC PANEL: CPT

## 2024-04-29 PROCEDURE — 36415 COLL VENOUS BLD VENIPUNCTURE: CPT

## 2024-04-29 PROCEDURE — 84443 ASSAY THYROID STIM HORMONE: CPT

## 2024-04-29 PROCEDURE — 85025 COMPLETE CBC W/AUTO DIFF WBC: CPT

## 2024-04-29 PROCEDURE — 83036 HEMOGLOBIN GLYCOSYLATED A1C: CPT

## 2024-04-29 PROCEDURE — 80061 LIPID PANEL: CPT

## 2024-04-30 RX ORDER — TIRZEPATIDE 2.5 MG/.5ML
1 INJECTION, SOLUTION SUBCUTANEOUS
Qty: 12 ML | Refills: 3 | Status: SHIPPED | OUTPATIENT
Start: 2024-04-30

## 2024-05-07 DIAGNOSIS — B02.9 HERPES ZOSTER WITHOUT COMPLICATION: ICD-10-CM

## 2024-05-07 RX ORDER — VALACYCLOVIR HYDROCHLORIDE 1 G/1
1000 TABLET, FILM COATED ORAL 3 TIMES DAILY
Qty: 21 TABLET | Refills: 0 | Status: SHIPPED | OUTPATIENT
Start: 2024-05-07 | End: 2024-05-14

## 2024-05-17 ENCOUNTER — DOCUMENTATION (OUTPATIENT)
Dept: HEALTH INFORMATION MANAGEMENT | Facility: OTHER | Age: 44
End: 2024-05-17
Payer: COMMERCIAL

## 2024-09-20 ENCOUNTER — APPOINTMENT (OUTPATIENT)
Dept: MEDICAL GROUP | Facility: MEDICAL CENTER | Age: 44
End: 2024-09-20
Payer: COMMERCIAL

## 2024-09-30 ENCOUNTER — EH NON-PROVIDER (OUTPATIENT)
Dept: OCCUPATIONAL MEDICINE | Facility: CLINIC | Age: 44
End: 2024-09-30

## 2024-09-30 DIAGNOSIS — Z23 NEED FOR VACCINATION: Primary | ICD-10-CM

## 2024-10-17 ENCOUNTER — NON-PROVIDER VISIT (OUTPATIENT)
Dept: URGENT CARE | Facility: CLINIC | Age: 44
End: 2024-10-17

## 2024-10-17 ENCOUNTER — APPOINTMENT (OUTPATIENT)
Dept: RADIOLOGY | Facility: IMAGING CENTER | Age: 44
End: 2024-10-17
Attending: PHYSICIAN ASSISTANT
Payer: COMMERCIAL

## 2024-10-17 ENCOUNTER — OCCUPATIONAL MEDICINE (OUTPATIENT)
Dept: URGENT CARE | Facility: CLINIC | Age: 44
End: 2024-10-17
Payer: COMMERCIAL

## 2024-10-17 VITALS
DIASTOLIC BLOOD PRESSURE: 80 MMHG | OXYGEN SATURATION: 97 % | BODY MASS INDEX: 31.89 KG/M2 | RESPIRATION RATE: 14 BRPM | HEIGHT: 63 IN | SYSTOLIC BLOOD PRESSURE: 120 MMHG | WEIGHT: 180 LBS | HEART RATE: 71 BPM | TEMPERATURE: 97.8 F

## 2024-10-17 DIAGNOSIS — S93.402A SPRAIN OF LEFT ANKLE, UNSPECIFIED LIGAMENT, INITIAL ENCOUNTER: ICD-10-CM

## 2024-10-17 DIAGNOSIS — Z02.83 ENCOUNTER FOR DRUG SCREENING: Primary | ICD-10-CM

## 2024-10-17 DIAGNOSIS — S90.32XA CONTUSION OF LEFT FOOT, INITIAL ENCOUNTER: ICD-10-CM

## 2024-10-17 LAB
AMP AMPHETAMINE: NORMAL
BAR BARBITURATES: NORMAL
BREATH ALCOHOL COMMENT: NORMAL
BZO BENZODIAZEPINES: NORMAL
COC COCAINE: NORMAL
INT CON NEG: NEGATIVE
INT CON POS: POSITIVE
MDMA ECSTASY: NORMAL
MET METHAMPHETAMINES: NORMAL
MTD METHADONE: NORMAL
OPI OPIATES: NORMAL
OXY OXYCODONE: NORMAL
PCP PHENCYCLIDINE: NORMAL
POC BREATHALIZER: 0 PERCENT (ref 0–0.01)
POC URINE DRUG SCREEN OCDRS: NORMAL
THC: NORMAL

## 2024-10-17 PROCEDURE — 82075 ASSAY OF BREATH ETHANOL: CPT | Performed by: NURSE PRACTITIONER

## 2024-10-17 PROCEDURE — 99213 OFFICE O/P EST LOW 20 MIN: CPT | Performed by: PHYSICIAN ASSISTANT

## 2024-10-17 PROCEDURE — 73610 X-RAY EXAM OF ANKLE: CPT | Mod: TC,LT | Performed by: RADIOLOGY

## 2024-10-17 PROCEDURE — 73630 X-RAY EXAM OF FOOT: CPT | Mod: TC,LT | Performed by: RADIOLOGY

## 2024-10-17 PROCEDURE — 3074F SYST BP LT 130 MM HG: CPT | Performed by: PHYSICIAN ASSISTANT

## 2024-10-17 PROCEDURE — 80305 DRUG TEST PRSMV DIR OPT OBS: CPT | Performed by: NURSE PRACTITIONER

## 2024-10-17 PROCEDURE — 3079F DIAST BP 80-89 MM HG: CPT | Performed by: PHYSICIAN ASSISTANT

## 2024-10-17 ASSESSMENT — ENCOUNTER SYMPTOMS
WEAKNESS: 0
TINGLING: 0
MYALGIAS: 1

## 2024-10-17 ASSESSMENT — FIBROSIS 4 INDEX: FIB4 SCORE: 0.84

## 2024-10-21 DIAGNOSIS — B02.9 HERPES ZOSTER WITHOUT COMPLICATION: ICD-10-CM

## 2024-10-21 RX ORDER — VALACYCLOVIR HYDROCHLORIDE 1 G/1
1000 TABLET, FILM COATED ORAL 2 TIMES DAILY
Qty: 20 TABLET | Refills: 0 | Status: SHIPPED | OUTPATIENT
Start: 2024-10-21 | End: 2024-10-31

## 2024-10-30 ENCOUNTER — APPOINTMENT (OUTPATIENT)
Dept: MEDICAL GROUP | Facility: MEDICAL CENTER | Age: 44
End: 2024-10-30
Payer: COMMERCIAL

## 2024-11-15 ENCOUNTER — OFFICE VISIT (OUTPATIENT)
Dept: MEDICAL GROUP | Facility: MEDICAL CENTER | Age: 44
End: 2024-11-15
Payer: COMMERCIAL

## 2024-11-15 VITALS
BODY MASS INDEX: 29.59 KG/M2 | DIASTOLIC BLOOD PRESSURE: 78 MMHG | OXYGEN SATURATION: 98 % | HEART RATE: 66 BPM | TEMPERATURE: 98.3 F | SYSTOLIC BLOOD PRESSURE: 116 MMHG | WEIGHT: 167 LBS | HEIGHT: 63 IN

## 2024-11-15 DIAGNOSIS — R73.03 PREDIABETES: ICD-10-CM

## 2024-11-15 DIAGNOSIS — N94.10 DYSPAREUNIA IN FEMALE: ICD-10-CM

## 2024-11-15 DIAGNOSIS — R11.0 NAUSEA: ICD-10-CM

## 2024-11-15 DIAGNOSIS — E66.3 OVERWEIGHT (BMI 25.0-29.9): ICD-10-CM

## 2024-11-15 DIAGNOSIS — Z00.00 ENCOUNTER FOR PREVENTIVE CARE: ICD-10-CM

## 2024-11-15 DIAGNOSIS — E78.5 HYPERLIPIDEMIA, UNSPECIFIED HYPERLIPIDEMIA TYPE: ICD-10-CM

## 2024-11-15 DIAGNOSIS — N94.10 DYSPAREUNIA, FEMALE: ICD-10-CM

## 2024-11-15 DIAGNOSIS — B37.9 YEAST INFECTION: ICD-10-CM

## 2024-11-15 PROCEDURE — 3074F SYST BP LT 130 MM HG: CPT | Performed by: STUDENT IN AN ORGANIZED HEALTH CARE EDUCATION/TRAINING PROGRAM

## 2024-11-15 PROCEDURE — 99214 OFFICE O/P EST MOD 30 MIN: CPT | Performed by: STUDENT IN AN ORGANIZED HEALTH CARE EDUCATION/TRAINING PROGRAM

## 2024-11-15 PROCEDURE — 3078F DIAST BP <80 MM HG: CPT | Performed by: STUDENT IN AN ORGANIZED HEALTH CARE EDUCATION/TRAINING PROGRAM

## 2024-11-15 RX ORDER — FLUCONAZOLE 150 MG/1
150 TABLET ORAL DAILY
Qty: 2 TABLET | Refills: 0 | Status: SHIPPED | OUTPATIENT
Start: 2024-11-15 | End: 2024-11-17

## 2024-11-15 RX ORDER — ONDANSETRON 4 MG/1
4 TABLET, ORALLY DISINTEGRATING ORAL EVERY 6 HOURS PRN
Qty: 15 TABLET | Refills: 0 | Status: SHIPPED | OUTPATIENT
Start: 2024-11-15

## 2024-11-15 ASSESSMENT — FIBROSIS 4 INDEX: FIB4 SCORE: 0.84

## 2024-11-15 ASSESSMENT — PATIENT HEALTH QUESTIONNAIRE - PHQ9: CLINICAL INTERPRETATION OF PHQ2 SCORE: 0

## 2024-11-18 NOTE — PROGRESS NOTES
Maame David is a 44 y.o. old female  who is here to establish care and discuss weight loss    Chief Complaint   Patient presents with    Establish Care     New to You     Medication Refill     Tirzepatide (MOUNJARO) 2.5 MG/0.5ML Solution Pen-injector    Referral Needed     A1c// Thyroid with lipids// CBC               She was previously on Tirzepatide for weight loss and interested in restarting the medication. She tolerated the medication well except mild episodes of nausea which was resolved with zofran. She has tried diet and exercise. Patient reports dyspareunia. She reports history of recurrent yeast infection and takes fluconazole during these episodes    Problem   Overweight (Bmi 25.0-29.9)   Yeast Infection   Dyspareunia in Female   Obesity (Bmi 30-39.9) (Resolved)         Review of Systems   Constitutional:  Negative for chills and fever.   Respiratory:  Negative for cough and hemoptysis.    Cardiovascular:  Negative for chest pain and palpitations.        Patient  has a past medical history of Dental disorder (08/21/2018), Endometriosis, Head ache, Migraine, and Urinary incontinence (08/21/2018).  Patient  has a past surgical history that includes other orthopedic surgery (Left); gyn surgery (2007); vaginal hysterectomy scope total (9/4/2018); salpingectomy (Bilateral, 9/4/2018); anterior and posterior repair (9/4/2018); enterocele repair (9/4/2018); bladder sling female (9/4/2018); vaginal suspension (9/4/2018); and el[place] by laparoscopy (N/A, 10/1/2018).  Family History   Problem Relation Age of Onset    Hypertension Mother     Alcohol/Drug Father     Diabetes Father     Diabetes Paternal Grandmother     Diabetes Paternal Grandfather      Social History     Tobacco Use    Smoking status: Former     Current packs/day: 0.00     Types: Cigarettes     Start date: 1/21/2007     Quit date: 1/21/2009     Years since quitting: 15.8    Smokeless tobacco: Never   Vaping Use    Vaping status: Never Used  "  Substance Use Topics    Alcohol use: Yes     Comment: 1-2 per month socially    Drug use: No     Patient Active Problem List    Diagnosis Date Noted    Overweight (BMI 25.0-29.9) 11/19/2024    Yeast infection 11/19/2024    Prediabetes 10/26/2022    Diarrhea 10/07/2022    Post-cholecystectomy syndrome 10/07/2022    Myofascial muscle pain 10/07/2022    Nonintractable headache 10/07/2022    Urinary incontinence, mixed 05/09/2022    Urinary incontinence without sensory awareness 05/09/2022    History of suburethral sling procedure 05/09/2022    Dyspareunia in female 05/09/2022    Chronic female pelvic pain 05/09/2022    Abnormal defecation 05/09/2022    Migraine without aura 06/27/2017    Gastroesophageal reflux disease without esophagitis 06/27/2017     Current Outpatient Medications   Medication Sig Dispense Refill    Tirzepatide 2.5 MG/0.5ML Solution Pen-injector Inject 2.5 mg under the skin every 7 days for 30 days. 2 mL 0    ondansetron (ZOFRAN ODT) 4 MG TABLET DISPERSIBLE Take 1 Tablet by mouth every 6 hours as needed for Nausea/Vomiting for up to 15 doses. 15 Tablet 0     No current facility-administered medications for this visit.    (including changes today)  Allergies: Morphine, Penicillins, and Promethazine hcl    /78 (BP Location: Left arm, Patient Position: Sitting, BP Cuff Size: Adult)   Pulse 66   Temp 36.8 °C (98.3 °F) (Temporal)   Ht 1.6 m (5' 3\")   Wt 75.8 kg (167 lb)   SpO2 98%      Physical Exam  Constitutional:       Appearance: Normal appearance.   Cardiovascular:      Rate and Rhythm: Normal rate and regular rhythm.      Pulses: Normal pulses.      Heart sounds: Normal heart sounds. No murmur heard.  Pulmonary:      Effort: Pulmonary effort is normal. No respiratory distress.      Breath sounds: Normal breath sounds.   Neurological:      Mental Status: She is alert.         Assessment and plan:    Problem List Items Addressed This Visit       Dyspareunia in female     -This is chronic " which has recently worsened. Patient does not think vaginal dryness is causing her symptoms.          Prediabetes    Relevant Orders    HEMOGLOBIN A1C    Overweight (BMI 25.0-29.9)     -A prescription of Tirzepatide 2.5 mg weekly will be sent to pharmacy  -She has had insurance unable to cover medication and prefers compounding pharmacy.Discussed regarding risks of compound pharmacy.           Yeast infection     -A refill of diflucan to use during episode of vaginal candidiasis was provided          Other Visit Diagnoses       Nausea        Relevant Medications    ondansetron (ZOFRAN ODT) 4 MG TABLET DISPERSIBLE    Hyperlipidemia, unspecified hyperlipidemia type        Relevant Orders    Lipid Profile    Encounter for preventive care        Relevant Orders    VITAMIN D,25 HYDROXY (DEFICIENCY)    Dyspareunia, female        Relevant Orders    Referral to OB/Gyn                Return if symptoms worsen or fail to improve.          Please note that this dictation was created using voice recognition software. I have made every reasonable attempt to correct obvious errors, but I expect that there are errors of grammar and possibly content that I did not discover before finalizing the note.

## 2024-11-19 PROBLEM — E66.3 OVERWEIGHT (BMI 25.0-29.9): Status: ACTIVE | Noted: 2024-11-19

## 2024-11-19 PROBLEM — E66.9 OBESITY (BMI 30-39.9): Status: RESOLVED | Noted: 2022-10-07 | Resolved: 2024-11-19

## 2024-11-19 PROBLEM — B37.9 YEAST INFECTION: Status: ACTIVE | Noted: 2024-11-19

## 2024-11-19 ASSESSMENT — ENCOUNTER SYMPTOMS
HEMOPTYSIS: 0
PALPITATIONS: 0
FEVER: 0
COUGH: 0
CHILLS: 0

## 2024-11-20 NOTE — ASSESSMENT & PLAN NOTE
-A prescription of Tirzepatide 2.5 mg weekly will be sent to pharmacy  -She has had insurance unable to cover medication and prefers compounding pharmacy.Discussed regarding risks of compound pharmacy.

## 2024-11-20 NOTE — ASSESSMENT & PLAN NOTE
-This is chronic which has recently worsened. Patient does not think vaginal dryness is causing her symptoms.

## 2024-11-26 ENCOUNTER — PATIENT MESSAGE (OUTPATIENT)
Dept: MEDICAL GROUP | Facility: MEDICAL CENTER | Age: 44
End: 2024-11-26
Payer: COMMERCIAL

## 2024-12-04 ENCOUNTER — TELEPHONE (OUTPATIENT)
Dept: MEDICAL GROUP | Facility: MEDICAL CENTER | Age: 44
End: 2024-12-04
Payer: COMMERCIAL

## 2024-12-04 DIAGNOSIS — B02.9 HERPES ZOSTER WITHOUT COMPLICATION: ICD-10-CM

## 2024-12-04 DIAGNOSIS — R11.0 NAUSEA: ICD-10-CM

## 2024-12-04 RX ORDER — VALACYCLOVIR HYDROCHLORIDE 1 G/1
1000 TABLET, FILM COATED ORAL 2 TIMES DAILY
Qty: 14 TABLET | Refills: 1 | Status: SHIPPED | OUTPATIENT
Start: 2024-12-04 | End: 2024-12-11

## 2024-12-12 ENCOUNTER — OFFICE VISIT (OUTPATIENT)
Dept: URGENT CARE | Facility: CLINIC | Age: 44
End: 2024-12-12
Payer: COMMERCIAL

## 2024-12-12 ENCOUNTER — HOSPITAL ENCOUNTER (OUTPATIENT)
Facility: MEDICAL CENTER | Age: 44
End: 2024-12-12
Attending: PHYSICIAN ASSISTANT
Payer: COMMERCIAL

## 2024-12-12 VITALS
HEART RATE: 91 BPM | WEIGHT: 169 LBS | RESPIRATION RATE: 20 BRPM | HEIGHT: 63 IN | BODY MASS INDEX: 29.95 KG/M2 | OXYGEN SATURATION: 96 % | TEMPERATURE: 98.5 F

## 2024-12-12 DIAGNOSIS — J02.9 SORE THROAT: ICD-10-CM

## 2024-12-12 DIAGNOSIS — J03.90 TONSILLITIS: ICD-10-CM

## 2024-12-12 LAB
FLUAV RNA SPEC QL NAA+PROBE: NEGATIVE
FLUBV RNA SPEC QL NAA+PROBE: NEGATIVE
RSV RNA SPEC QL NAA+PROBE: NEGATIVE
S PYO DNA SPEC NAA+PROBE: NOT DETECTED
SARS-COV-2 RNA RESP QL NAA+PROBE: NEGATIVE

## 2024-12-12 PROCEDURE — 0241U POCT CEPHEID COV-2, FLU A/B, RSV - PCR: CPT | Performed by: PHYSICIAN ASSISTANT

## 2024-12-12 PROCEDURE — 99214 OFFICE O/P EST MOD 30 MIN: CPT | Performed by: PHYSICIAN ASSISTANT

## 2024-12-12 PROCEDURE — 87651 STREP A DNA AMP PROBE: CPT | Performed by: PHYSICIAN ASSISTANT

## 2024-12-12 PROCEDURE — 87070 CULTURE OTHR SPECIMN AEROBIC: CPT

## 2024-12-12 RX ORDER — LIDOCAINE HYDROCHLORIDE 20 MG/ML
5 SOLUTION OROPHARYNGEAL 3 TIMES DAILY PRN
Qty: 100 ML | Refills: 0 | Status: SHIPPED | OUTPATIENT
Start: 2024-12-12

## 2024-12-12 RX ORDER — DEXAMETHASONE SODIUM PHOSPHATE 10 MG/ML
10 INJECTION INTRAMUSCULAR; INTRAVENOUS ONCE
Status: COMPLETED | OUTPATIENT
Start: 2024-12-12 | End: 2024-12-12

## 2024-12-12 RX ORDER — CEFDINIR 300 MG/1
300 CAPSULE ORAL 2 TIMES DAILY
Qty: 20 CAPSULE | Refills: 0 | Status: SHIPPED | OUTPATIENT
Start: 2024-12-12

## 2024-12-12 RX ADMIN — DEXAMETHASONE SODIUM PHOSPHATE 10 MG: 10 INJECTION INTRAMUSCULAR; INTRAVENOUS at 14:18

## 2024-12-12 ASSESSMENT — ENCOUNTER SYMPTOMS
SORE THROAT: 1
SHORTNESS OF BREATH: 0
DIARRHEA: 0
NECK PAIN: 1
ABDOMINAL PAIN: 0
FEVER: 1
HOARSE VOICE: 1
GASTROINTESTINAL NEGATIVE: 1
CHILLS: 1
MYALGIAS: 1
COUGH: 1
HEADACHES: 1
DIZZINESS: 0
TROUBLE SWALLOWING: 1
SWOLLEN GLANDS: 1
STRIDOR: 0
CARDIOVASCULAR NEGATIVE: 1

## 2024-12-12 ASSESSMENT — FIBROSIS 4 INDEX: FIB4 SCORE: 0.84

## 2024-12-12 NOTE — PROGRESS NOTES
"Subjective     Maame David is an extremely pleasant 44 y.o. female who presents with Sore Throat (Headache X4days )            Pharyngitis   The current episode started in the past 7 days. The problem has been gradually worsening. The maximum temperature recorded prior to her arrival was 101 - 101.9 F. The fever has been present for 1 to 2 days. Associated symptoms include congestion, coughing, headaches, a hoarse voice, neck pain, swollen glands and trouble swallowing. Pertinent negatives include no abdominal pain, diarrhea, ear pain, shortness of breath or stridor. She has tried NSAIDs for the symptoms. The treatment provided mild relief.       PMH:  has a past medical history of Dental disorder (08/21/2018), Endometriosis, Head ache, Migraine, and Urinary incontinence (08/21/2018).  MEDS:   Current Outpatient Medications:     cefdinir (OMNICEF) 300 MG Cap, Take 1 Capsule by mouth 2 times a day., Disp: 20 Capsule, Rfl: 0    lidocaine (XYLOCAINE) 2 % Solution, Take 5 mL by mouth 3 times a day as needed for Throat/Mouth Pain., Disp: 100 mL, Rfl: 0    ondansetron (ZOFRAN ODT) 4 MG TABLET DISPERSIBLE, Take 1 Tablet by mouth every 6 hours as needed for Nausea/Vomiting for up to 15 doses., Disp: 15 Tablet, Rfl: 0  ALLERGIES:   Allergies   Allergen Reactions    Morphine Unspecified     Flushing and felt hot    Penicillins Anaphylaxis     Stopped breathing when 9 yo  Tolerates Keflex, Tolerates Ceftriaxone    Promethazine Hcl Unspecified     \"mini seizures\"     SURGHX:   Past Surgical History:   Procedure Laterality Date    MANDI BY LAPAROSCOPY N/A 10/1/2018    Procedure: MANID BY LAPAROSCOPY;  Surgeon: Adal Villanueva M.D.;  Location: SURGERY [facility];  Service: General    VAGINAL HYSTERECTOMY SCOPE TOTAL  9/4/2018    Procedure: VAGINAL HYSTERECTOMY SCOPE TOTAL;  Surgeon: Atul Aguilera M.D.;  Location: SURGERY SAME DAY [facility];  Service: Gynecology    SALPINGECTOMY Bilateral 9/4/2018    Procedure: " SALPINGECTOMY;  Surgeon: Atul Aguilera M.D.;  Location: SURGERY SAME DAY [facility];  Service: Gynecology    ANTERIOR AND POSTERIOR REPAIR  9/4/2018    Procedure: ANTERIOR AND POSTERIOR REPAIR - PERINEOPLASTY;  Surgeon: Atul Aguilera M.D.;  Location: SURGERY SAME DAY [facility];  Service: Gynecology    ENTEROCELE REPAIR  9/4/2018    Procedure: ENTEROCELE REPAIR;  Surgeon: Atul Aguilera M.D.;  Location: SURGERY SAME DAY [facility];  Service: Gynecology    BLADDER SLING FEMALE  9/4/2018    Procedure: BLADDER SLING FEMALE - TOT;  Surgeon: Atul Aguilera M.D.;  Location: SURGERY SAME DAY [facility];  Service: Gynecology    VAGINAL SUSPENSION  9/4/2018    Procedure: VAGINAL SUSPENSION - SACROSPINOUS VAULT;  Surgeon: Atul Aguilera M.D.;  Location: SURGERY SAME DAY [facility];  Service: Gynecology    GYN SURGERY  2007    ablation    OTHER ORTHOPEDIC SURGERY Left     shoulder     SOCHX:  reports that she quit smoking about 15 years ago. Her smoking use included cigarettes. She started smoking about 17 years ago. She has never used smokeless tobacco. She reports current alcohol use. She reports that she does not use drugs.  FH: family history includes Alcohol/Drug in her father; Diabetes in her father, paternal grandfather, and paternal grandmother; Hypertension in her mother.        Review of Systems   Constitutional:  Positive for chills and fever.   HENT:  Positive for congestion, hoarse voice, sore throat and trouble swallowing. Negative for ear pain.    Respiratory:  Positive for cough. Negative for shortness of breath and stridor.    Cardiovascular: Negative.    Gastrointestinal: Negative.  Negative for abdominal pain and diarrhea.   Musculoskeletal:  Positive for myalgias and neck pain.   Neurological:  Positive for headaches. Negative for dizziness.       Medications, Allergies, and current problem list reviewed today in Epic           Objective     Pulse 91   Temp 36.9 °C (98.5 °F)  "(Temporal)   Resp 20   Ht 1.6 m (5' 3\")   Wt 76.7 kg (169 lb)   LMP 03/21/2018 (Approximate)   SpO2 96%   BMI 29.94 kg/m²      Physical Exam  Vitals and nursing note reviewed.   Constitutional:       General: She is not in acute distress.     Appearance: Normal appearance. She is well-developed. She is not ill-appearing, toxic-appearing or diaphoretic.   HENT:      Head: Normocephalic and atraumatic.      Right Ear: Tympanic membrane, ear canal and external ear normal.      Left Ear: Tympanic membrane, ear canal and external ear normal.      Nose: Nose normal. No congestion or rhinorrhea.      Mouth/Throat:      Mouth: Mucous membranes are moist.      Pharynx: Uvula midline. Pharyngeal swelling and posterior oropharyngeal erythema present. No oropharyngeal exudate or uvula swelling.      Tonsils: No tonsillar exudate or tonsillar abscesses.      Comments: Tonsillar enlargement and erythema but no obvious exudate.  No palatal petechiae  Eyes:      General:         Right eye: No discharge.         Left eye: No discharge.      Conjunctiva/sclera: Conjunctivae normal.   Cardiovascular:      Rate and Rhythm: Normal rate and regular rhythm.      Pulses: Normal pulses.      Heart sounds: Normal heart sounds.   Pulmonary:      Effort: Pulmonary effort is normal. No respiratory distress.      Breath sounds: Normal breath sounds. No wheezing, rhonchi or rales.   Musculoskeletal:      Cervical back: Normal range of motion and neck supple.      Right lower leg: No edema.      Left lower leg: No edema.   Lymphadenopathy:      Cervical: Cervical adenopathy present.   Skin:     General: Skin is warm and dry.   Neurological:      General: No focal deficit present.      Mental Status: She is alert and oriented to person, place, and time. Mental status is at baseline.   Psychiatric:         Mood and Affect: Mood normal.         Behavior: Behavior normal.         Thought Content: Thought content normal.         Judgment: " Judgment normal.                             Assessment & Plan      This is a very pleasant 44-year-old female presenting with sore throat for the past 5 days.  Symptoms have been worsening and now she is having trouble swallowing.  She has had intermittent fever chills and bodyaches.  Slight cough but no significant congestion.  Vital signs stable.  Exam shows posterior pharynx erythema and edema with tonsillar enlargement and erythema.  No tonsillar exudate.  Uvula is midline and there is no palatal petechiae.  Regional adenopathy noted.  Lungs are clear without wheezing rhonchi, rales or stridor.  In clinic COVID, flu, strep, RSV testing negative.  High clinical concern for tonsillitis therefore throat culture initiated. Patient was given a contingent antibiotic prescription to fill and use as directed if symptoms progressed as discussed and agreed upon.    Assessment & Plan  Sore throat    Orders:    POCT Cepheid Group A Strep - PCR    POCT Cepheid CoV-2, Flu A/B, RSV - PCR    dexamethasone (Decadron) injection (check route below) 10 mg    CULTURE THROAT; Future    Tonsillitis    Orders:    dexamethasone (Decadron) injection (check route below) 10 mg    CULTURE THROAT; Future    cefdinir (OMNICEF) 300 MG Cap; Take 1 Capsule by mouth 2 times a day.    lidocaine (XYLOCAINE) 2 % Solution; Take 5 mL by mouth 3 times a day as needed for Throat/Mouth Pain.            I personally reviewed prior external notes and test results pertinent to today's visit. Return to clinic or go to ED if symptoms worsen or persist. Red flag symptoms and indications for ED discussed at length. Patient/Parent/Guardian voices understanding.  AVS with post-visit instructions printed and provided or given verbally.  Follow-up with your primary care provider in 3-5 days. All side effects and potential interactions of prescribed medication discussed including allergic response, GI upset, tendon injury, rash, sedation, OCP effectiveness,  etc.    Please note that this dictation was created using voice recognition software. I have made every reasonable attempt to correct obvious errors, but I expect that there are errors of grammar and possibly content that I did not discover before finalizing the note.

## 2025-02-04 ENCOUNTER — HOSPITAL ENCOUNTER (OUTPATIENT)
Facility: MEDICAL CENTER | Age: 45
End: 2025-02-04
Payer: COMMERCIAL

## 2025-02-04 ENCOUNTER — OFFICE VISIT (OUTPATIENT)
Dept: URGENT CARE | Facility: CLINIC | Age: 45
End: 2025-02-04
Payer: COMMERCIAL

## 2025-02-04 VITALS
WEIGHT: 165 LBS | OXYGEN SATURATION: 90 % | TEMPERATURE: 98.4 F | BODY MASS INDEX: 29.23 KG/M2 | HEART RATE: 87 BPM | RESPIRATION RATE: 16 BRPM | HEIGHT: 63 IN | DIASTOLIC BLOOD PRESSURE: 70 MMHG | SYSTOLIC BLOOD PRESSURE: 106 MMHG

## 2025-02-04 DIAGNOSIS — R19.7 DIARRHEA, UNSPECIFIED TYPE: ICD-10-CM

## 2025-02-04 DIAGNOSIS — R11.2 NAUSEA AND VOMITING, UNSPECIFIED VOMITING TYPE: ICD-10-CM

## 2025-02-04 DIAGNOSIS — B33.8 RSV INFECTION: ICD-10-CM

## 2025-02-04 LAB
C PARVUM AG STL QL IA.RAPID: NEGATIVE
FLUAV RNA SPEC QL NAA+PROBE: NEGATIVE
FLUBV RNA SPEC QL NAA+PROBE: NEGATIVE
G LAMBLIA AG STL QL IA.RAPID: NEGATIVE
RSV RNA SPEC QL NAA+PROBE: POSITIVE
SARS-COV-2 RNA RESP QL NAA+PROBE: NEGATIVE

## 2025-02-04 PROCEDURE — 87329 GIARDIA AG IA: CPT

## 2025-02-04 PROCEDURE — 87045 FECES CULTURE AEROBIC BACT: CPT

## 2025-02-04 PROCEDURE — 3078F DIAST BP <80 MM HG: CPT

## 2025-02-04 PROCEDURE — 99213 OFFICE O/P EST LOW 20 MIN: CPT

## 2025-02-04 PROCEDURE — 87328 CRYPTOSPORIDIUM AG IA: CPT

## 2025-02-04 PROCEDURE — 87899 AGENT NOS ASSAY W/OPTIC: CPT

## 2025-02-04 PROCEDURE — 0241U POCT CEPHEID COV-2, FLU A/B, RSV - PCR: CPT

## 2025-02-04 PROCEDURE — 87046 STOOL CULTR AEROBIC BACT EA: CPT

## 2025-02-04 PROCEDURE — 3074F SYST BP LT 130 MM HG: CPT

## 2025-02-04 RX ORDER — LOPERAMIDE HYDROCHLORIDE 2 MG/1
CAPSULE ORAL
Qty: 20 CAPSULE | Refills: 0 | Status: SHIPPED | OUTPATIENT
Start: 2025-02-04

## 2025-02-04 ASSESSMENT — FIBROSIS 4 INDEX: FIB4 SCORE: 0.86

## 2025-02-04 ASSESSMENT — ENCOUNTER SYMPTOMS
DIARRHEA: 1
ABDOMINAL PAIN: 1
FEVER: 0
VOMITING: 1
NAUSEA: 1

## 2025-02-04 NOTE — PROGRESS NOTES
Verbal consent was acquired by the patient to use [employer] ambient listening note generation during this visit   Subjective:   Maame David is a 45 y.o. female who presents for Diarrhea (X today with nausea, and vomiting.)      HPI:  History of Present Illness  The patient is a 45-year-old female who presents today for nausea, vomiting, diarrhea, fever, and chills.    She reports experiencing chills but no body aches. She has been experiencing severe diarrhea, prompting her to collect a stool sample for culture. The onset of her symptoms was on Saturday, following a meal at a new restaurant.The following day, she experienced chills, near syncope, diarrhea, and nausea while at work. These symptoms subsided after approximately 3 hours. Her infant grandson has also been experiencing vomiting and diarrhea since Saturday, but without fever and with normal urination. Today, she experienced a recurrence of her symptoms, including abdominal pain, diarrhea, and vomiting. She reports no blood in her stool and has not taken any recent antibiotics.        Review of Systems   Constitutional:  Negative for fever.   Gastrointestinal:  Positive for abdominal pain, diarrhea, nausea and vomiting.       Medications:    Current Outpatient Medications on File Prior to Visit   Medication Sig Dispense Refill    cefdinir (OMNICEF) 300 MG Cap Take 1 Capsule by mouth 2 times a day. (Patient not taking: Reported on 2/4/2025) 20 Capsule 0    lidocaine (XYLOCAINE) 2 % Solution Take 5 mL by mouth 3 times a day as needed for Throat/Mouth Pain. (Patient not taking: Reported on 2/4/2025) 100 mL 0    ondansetron (ZOFRAN ODT) 4 MG TABLET DISPERSIBLE Take 1 Tablet by mouth every 6 hours as needed for Nausea/Vomiting for up to 15 doses. (Patient not taking: Reported on 2/4/2025) 15 Tablet 0     No current facility-administered medications on file prior to visit.        Allergies:   Morphine, Penicillins, and Promethazine hcl    Problem  List:   Patient Active Problem List   Diagnosis    Migraine without aura    Gastroesophageal reflux disease without esophagitis    Urinary incontinence, mixed    Urinary incontinence without sensory awareness    History of suburethral sling procedure    Dyspareunia in female    Chronic female pelvic pain    Abnormal defecation    Diarrhea    Post-cholecystectomy syndrome    Myofascial muscle pain    Nonintractable headache    Prediabetes    Overweight (BMI 25.0-29.9)    Yeast infection        Surgical History:  Past Surgical History:   Procedure Laterality Date    MANDI BY LAPAROSCOPY N/A 10/1/2018    Procedure: MANDI BY LAPAROSCOPY;  Surgeon: Adal Villanueva M.D.;  Location: SURGERY [facility];  Service: General    VAGINAL HYSTERECTOMY SCOPE TOTAL  9/4/2018    Procedure: VAGINAL HYSTERECTOMY SCOPE TOTAL;  Surgeon: Atul Aguilera M.D.;  Location: SURGERY SAME DAY [facility] [place];  Service: Gynecology    SALPINGECTOMY Bilateral 9/4/2018    Procedure: SALPINGECTOMY;  Surgeon: Atul Aguilera M.D.;  Location: SURGERY SAME DAY [facility] [place];  Service: Gynecology    ANTERIOR AND POSTERIOR REPAIR  9/4/2018    Procedure: ANTERIOR AND POSTERIOR REPAIR - PERINEOPLASTY;  Surgeon: Atul Aguilera M.D.;  Location: SURGERY SAME DAY [facility] [place];  Service: Gynecology    ENTEROCELE REPAIR  9/4/2018    Procedure: ENTEROCELE REPAIR;  Surgeon: Atul Aguilera M.D.;  Location: SURGERY SAME DAY [facility] [place];  Service: Gynecology    BLADDER SLING FEMALE  9/4/2018    Procedure: BLADDER SLING FEMALE - TOT;  Surgeon: Atul Aguilera M.D.;  Location: SURGERY SAME DAY [facility] [place];  Service: Gynecology    VAGINAL SUSPENSION  9/4/2018    Procedure: VAGINAL SUSPENSION - SACROSPINOUS VAULT;  Surgeon: Atul Aguilera M.D.;  Location: SURGERY SAME DAY [facility] [place];  Service: Gynecology    GYN SURGERY  2007    ablation    OTHER ORTHOPEDIC SURGERY Left     shoulder       Past Social Hx:   Social History     Tobacco Use    Smoking status:  "Former     Current packs/day: 0.00     Types: Cigarettes     Start date: 2007     Quit date: 2009     Years since quittin.0    Smokeless tobacco: Never   Vaping Use    Vaping status: Never Used   Substance Use Topics    Alcohol use: Yes     Comment: 1-2 per month socially    Drug use: No          Problem list, medications, and allergies reviewed by myself today in Epic.     Objective:     /70   Pulse 87   Temp 36.9 °C (98.4 °F) (Temporal)   Resp 16   Ht 1.6 m (5' 3\")   Wt 74.8 kg (165 lb)   LMP 2018 (Approximate)   SpO2 90%   BMI 29.23 kg/m²     Physical Exam  Vitals and nursing note reviewed.   Constitutional:       General: She is not in acute distress.     Appearance: Normal appearance. She is normal weight. She is not ill-appearing, toxic-appearing or diaphoretic.   HENT:      Head: Normocephalic and atraumatic.   Cardiovascular:      Rate and Rhythm: Normal rate and regular rhythm.      Pulses: Normal pulses.      Heart sounds: Normal heart sounds. No murmur heard.     No friction rub. No gallop.   Pulmonary:      Effort: Pulmonary effort is normal. No respiratory distress.      Breath sounds: Normal breath sounds. No stridor. No wheezing, rhonchi or rales.   Chest:      Chest wall: No tenderness.   Abdominal:      General: Abdomen is flat. Bowel sounds are normal.      Tenderness: There is generalized abdominal tenderness.   Musculoskeletal:      Cervical back: Neck supple. No tenderness.   Lymphadenopathy:      Cervical: No cervical adenopathy.   Skin:     General: Skin is warm and dry.      Capillary Refill: Capillary refill takes less than 2 seconds.   Neurological:      General: No focal deficit present.      Mental Status: She is alert and oriented to person, place, and time. Mental status is at baseline.      Gait: Gait normal.   Psychiatric:         Mood and Affect: Mood normal.         Behavior: Behavior normal.         Thought Content: Thought content normal.         " Judgment: Judgment normal.         Assessment/Plan:     Diagnosis and associated orders:   1. Nausea and vomiting, unspecified vomiting type  POCT CoV-2, Flu A/B, RSV by PCR      2. Diarrhea, unspecified type  POCT CoV-2, Flu A/B, RSV by PCR    CULTURE STOOL    Complete O&P    loperamide (IMODIUM) 2 MG Cap      3. RSV infection           Results for orders placed or performed in visit on 02/04/25   POCT CoV-2, Flu A/B, RSV by PCR    Collection Time: 02/04/25  3:10 PM   Result Value Ref Range    SARS-CoV-2 by PCR Negative Negative, Invalid    Influenza virus A RNA Negative Negative, Invalid    Influenza virus B, PCR Negative Negative, Invalid    RSV, PCR Positive (A) Negative, Invalid          Comments/MDM:   Pt is clinically stable at today's acute urgent care visit.  No acute distress noted. Appropriate for outpatient management at this time.     Assessment & Plan    Her symptoms, including nausea, vomiting, diarrhea, and chills. RSV+. A GI cocktail will be administered to alleviate her abdominal pain. A prescription for Imodium has been provided, with instructions to take one capsule after each loose stool, not exceeding four capsules per day. She is advised to rest and maintain hydration with Pedialyte. A swab test for influenza will be conducted. Stool culture and O & P tests have been ordered. She has been instructed to continue taking Zofran as needed.            Discussed DDx, management options (risks,benefits, and alternatives to planned treatment), natural progression and supportive care.  Expressed understanding and the treatment plan was agreed upon. Questions were encouraged and answered   Return to urgent care prn if new or worsening sx or if there is no improvement in condition prn.    Educated in Red flags and indications to immediately call 911 or present to the Emergency Department.   Advised the patient to follow-up with the primary care physician for recheck, reevaluation, and consideration of  further management.    I personally reviewed prior external notes and test results pertinent to today's visit.  I have independently reviewed and interpreted all diagnostics ordered during this urgent care acute visit.       Please note that this dictation was created using voice recognition software. I have made a reasonable attempt to correct obvious errors, but I expect that there are errors of grammar and possibly content that I did not discover before finalizing the note.    This note was electronically signed by BHARATH Carver

## 2025-02-05 LAB
E COLI SXT1+2 STL IA: NORMAL
SIGNIFICANT IND 70042: NORMAL
SITE SITE: NORMAL
SOURCE SOURCE: NORMAL

## 2025-02-07 LAB
BACTERIA STL CULT: NORMAL
E COLI SXT1+2 STL IA: NORMAL
SIGNIFICANT IND 70042: NORMAL
SITE SITE: NORMAL
SOURCE SOURCE: NORMAL

## 2025-03-10 DIAGNOSIS — J06.9 VIRAL URI: ICD-10-CM

## 2025-03-14 ENCOUNTER — APPOINTMENT (OUTPATIENT)
Dept: RADIOLOGY | Facility: MEDICAL CENTER | Age: 45
End: 2025-03-14
Attending: STUDENT IN AN ORGANIZED HEALTH CARE EDUCATION/TRAINING PROGRAM
Payer: COMMERCIAL

## 2025-03-14 DIAGNOSIS — Z12.31 VISIT FOR SCREENING MAMMOGRAM: ICD-10-CM

## 2025-03-18 DIAGNOSIS — B02.9 HERPES ZOSTER WITHOUT COMPLICATION: ICD-10-CM

## 2025-03-18 RX ORDER — VALACYCLOVIR HYDROCHLORIDE 1 G/1
1000 TABLET, FILM COATED ORAL 3 TIMES DAILY
Qty: 21 TABLET | Refills: 3 | Status: SHIPPED | OUTPATIENT
Start: 2025-03-18 | End: 2025-04-15

## 2025-04-04 ENCOUNTER — APPOINTMENT (OUTPATIENT)
Dept: RADIOLOGY | Facility: MEDICAL CENTER | Age: 45
End: 2025-04-04
Attending: STUDENT IN AN ORGANIZED HEALTH CARE EDUCATION/TRAINING PROGRAM
Payer: COMMERCIAL

## 2025-04-11 ENCOUNTER — APPOINTMENT (OUTPATIENT)
Dept: RADIOLOGY | Facility: MEDICAL CENTER | Age: 45
End: 2025-04-11
Attending: STUDENT IN AN ORGANIZED HEALTH CARE EDUCATION/TRAINING PROGRAM
Payer: COMMERCIAL

## 2025-04-11 DIAGNOSIS — Z12.31 VISIT FOR SCREENING MAMMOGRAM: ICD-10-CM

## 2025-04-11 PROCEDURE — 77067 SCR MAMMO BI INCL CAD: CPT

## 2025-04-15 ENCOUNTER — RESULTS FOLLOW-UP (OUTPATIENT)
Dept: MEDICAL GROUP | Facility: MEDICAL CENTER | Age: 45
End: 2025-04-15

## 2025-05-06 ENCOUNTER — PATIENT MESSAGE (OUTPATIENT)
Dept: MEDICAL GROUP | Facility: MEDICAL CENTER | Age: 45
End: 2025-05-06

## 2025-05-06 ENCOUNTER — HOSPITAL ENCOUNTER (OUTPATIENT)
Dept: LAB | Facility: MEDICAL CENTER | Age: 45
End: 2025-05-06
Attending: STUDENT IN AN ORGANIZED HEALTH CARE EDUCATION/TRAINING PROGRAM
Payer: COMMERCIAL

## 2025-05-06 DIAGNOSIS — E66.3 OVERWEIGHT (BMI 25.0-29.9): ICD-10-CM

## 2025-05-06 DIAGNOSIS — E78.5 HYPERLIPIDEMIA, UNSPECIFIED HYPERLIPIDEMIA TYPE: ICD-10-CM

## 2025-05-06 DIAGNOSIS — R73.03 PREDIABETES: ICD-10-CM

## 2025-05-06 DIAGNOSIS — Z00.00 ENCOUNTER FOR PREVENTIVE CARE: ICD-10-CM

## 2025-05-06 DIAGNOSIS — R73.03 PREDIABETES: Primary | ICD-10-CM

## 2025-05-06 DIAGNOSIS — R11.0 NAUSEA: ICD-10-CM

## 2025-05-06 LAB
25(OH)D3 SERPL-MCNC: 23 NG/ML (ref 30–100)
AMYLASE SERPL-CCNC: 41 U/L (ref 20–103)
CHOLEST SERPL-MCNC: 170 MG/DL (ref 100–199)
EST. AVERAGE GLUCOSE BLD GHB EST-MCNC: 103 MG/DL
HBA1C MFR BLD: 5.2 % (ref 4–5.6)
HDLC SERPL-MCNC: 49 MG/DL
LDLC SERPL CALC-MCNC: 114 MG/DL
LIPASE SERPL-CCNC: 45 U/L (ref 11–82)
TRIGL SERPL-MCNC: 33 MG/DL (ref 0–149)

## 2025-05-06 PROCEDURE — 82306 VITAMIN D 25 HYDROXY: CPT

## 2025-05-06 PROCEDURE — 82150 ASSAY OF AMYLASE: CPT

## 2025-05-06 PROCEDURE — 83690 ASSAY OF LIPASE: CPT

## 2025-05-06 PROCEDURE — 83036 HEMOGLOBIN GLYCOSYLATED A1C: CPT

## 2025-05-06 PROCEDURE — 36415 COLL VENOUS BLD VENIPUNCTURE: CPT

## 2025-05-06 PROCEDURE — 80061 LIPID PANEL: CPT

## 2025-05-08 ENCOUNTER — RESULTS FOLLOW-UP (OUTPATIENT)
Dept: MEDICAL GROUP | Facility: MEDICAL CENTER | Age: 45
End: 2025-05-08

## 2025-05-09 ENCOUNTER — APPOINTMENT (OUTPATIENT)
Dept: DERMATOLOGY | Facility: IMAGING CENTER | Age: 45
End: 2025-05-09

## 2025-06-30 ENCOUNTER — APPOINTMENT (OUTPATIENT)
Dept: MEDICAL GROUP | Facility: MEDICAL CENTER | Age: 45
End: 2025-06-30
Payer: COMMERCIAL

## 2025-08-27 DIAGNOSIS — Z20.822 EXPOSURE TO COVID-19 VIRUS: Primary | ICD-10-CM

## (undated) DEVICE — SODIUM CHL IRRIGATION 0.9% 1000ML (12EA/CA)

## (undated) DEVICE — SUTURE 4-0 VICRYL PLUS FS-2 - 27 INCH (36/BX)

## (undated) DEVICE — KIT ANESTHESIA W/CIRCUIT & 3/LT BAG W/FILTER (20EA/CA)

## (undated) DEVICE — GLOVE BIOGEL SZ 8 SURGICAL PF LTX - (50PR/BX 4BX/CA)

## (undated) DEVICE — SET EXTENSION WITH 2 PORTS (48EA/CA) ***PART #2C8610 IS A SUBSTITUTE*****

## (undated) DEVICE — PAD SANITARY 11IN MAXI IND WRAPPED  (12EA/PK 24PK/CA)

## (undated) DEVICE — HEAD HOLDER JUNIOR/ADULT

## (undated) DEVICE — TUBING INSUFFLATION - (10/BX)

## (undated) DEVICE — SUTURE 0 VICRYL PLUS CT-1 - 8 X 18 INCH (12/BX)

## (undated) DEVICE — KIT ROOM DECONTAMINATION

## (undated) DEVICE — GLOVESZ 8.5 BIOGEL PI MICRO - PF LF (50PR/BX)

## (undated) DEVICE — GLOVE BIOGEL PI INDICATOR SZ 7.0 SURGICAL PF LF - (50/BX 4BX/CA)

## (undated) DEVICE — TUBING CLEARLINK DUO-VENT - C-FLO (48EA/CA)

## (undated) DEVICE — MASK ANESTHESIA ADULT  - (100/CA)

## (undated) DEVICE — SENSOR SPO2 NEO LNCS ADHESIVE (20/BX) SEE USER NOTES

## (undated) DEVICE — ARMREST CRADLE FOAM - (2PR/PK 12PR/CA)

## (undated) DEVICE — SET SUCTION/IRRIGATION WITH DISPOSABLE TIP (6/CA )PART #0250-070-520 IS A SUB

## (undated) DEVICE — ELECTRODE 5MM LHK LAPSCP STERILE DISP- MEGADYNE  (5/CA)

## (undated) DEVICE — SET LEADWIRE 5 LEAD BEDSIDE DISPOSABLE ECG (1SET OF 5/EA)

## (undated) DEVICE — CANISTER SUCTION RIGID RED 1500CC (40EA/CA)

## (undated) DEVICE — CANNULA W/SEAL 5X100 Z-THRE - ADED KII (12/BX)

## (undated) DEVICE — ELECTRODE 850 FOAM ADHESIVE - HYDROGEL RADIOTRNSPRNT (50/PK)

## (undated) DEVICE — NEPTUNE 4 PORT MANIFOLD - (20/PK)

## (undated) DEVICE — BLADE SURGICAL #11 - (50/BX)

## (undated) DEVICE — ELECTRODE DUAL RETURN W/ CORD - (50/PK)

## (undated) DEVICE — BAG RETRIEVAL 10ML (10EA/BX)

## (undated) DEVICE — TUBE CONNECTING SUCTION - CLEAR PLASTIC STERILE 72 IN (50EA/CA)

## (undated) DEVICE — SUTURE 0 COATED VICRYL 6-18IN - (12PK/BX)

## (undated) DEVICE — DETERGENT RENUZYME PLUS 10 OZ PACKET (50/BX)

## (undated) DEVICE — GLOVE BIOGEL SZ 8.5 SURGICAL PF LTX - (50PR/BX 4BX/CA)

## (undated) DEVICE — CHLORAPREP 26 ML APPLICATOR - ORANGE TINT(25/CA)

## (undated) DEVICE — BLADE SURGICAL #15 - (50/BX 3BX/CA)

## (undated) DEVICE — KIT  I.V. START (100EA/CA)

## (undated) DEVICE — DEVICE SUTURE CAPTURING

## (undated) DEVICE — GOWN WARMING STANDARD FLEX - (30/CA)

## (undated) DEVICE — SUTURE GENERAL

## (undated) DEVICE — TRAY FOLEY CATHETER STATLOCK 16FR SURESTEP  (10EA/CA)

## (undated) DEVICE — SYRINGE 10 ML CONTROL LL (25EA/BX 4BX/CA)

## (undated) DEVICE — CLIP MED LG INTNL HRZN TI ESCP - (20/BX)

## (undated) DEVICE — SUCTION INSTRUMENT YANKAUER BULBOUS TIP W/O VENT (50EA/CA)

## (undated) DEVICE — TROCAR STEP 11MM - (3/CA)

## (undated) DEVICE — PACK MINOR BASIN - (2EA/CA)

## (undated) DEVICE — CANISTER SUCTION 3000ML MECHANICAL FILTER AUTO SHUTOFF MEDI-VAC NONSTERILE LF DISP  (40EA/CA)

## (undated) DEVICE — LACTATED RINGERS INJ 1000 ML - (14EA/CA 60CA/PF)

## (undated) DEVICE — SET IRRIGATION CYSTOSCOPY TUBE L80 IN (20EA/CA)

## (undated) DEVICE — DRAPE VAGINAL BIB W/ POUCH (10EA/CA)

## (undated) DEVICE — BLADE SURGICAL #10 - (50/BX)

## (undated) DEVICE — TUBE E-T HI-LO CUFF 8.0MM (10EA/PK)

## (undated) DEVICE — PROTECTOR ULNA NERVE - (36PR/CA)

## (undated) DEVICE — SUTURE 2-0 VICRYL PLUS CT-1 36 (36PK/BX)"

## (undated) DEVICE — BANDAID SHEER STRIP 3/4 IN (100EA/BX 12BX/CA)

## (undated) DEVICE — TUBING SETDISPOS HIGH FLOW II - (10/BX)

## (undated) DEVICE — TROCAR LAPSCP 100MM 12MM NTHRD - (6/BX)

## (undated) DEVICE — WATER IRRIGATION STERILE 1000ML (12EA/CA)

## (undated) DEVICE — TROCAR Z THREAD12MM OPTICAL - NON BLADED (6/BX)

## (undated) DEVICE — GLOVE BIOGEL PI INDICATOR SZ 6.5 SURGICAL PF LF - (50/BX 4BX/CA)

## (undated) DEVICE — GLOVE SZ 6.5 BIOGEL PI MICRO - PF LF (50PR/BX)

## (undated) DEVICE — WATER IRRIG. STER. 1500 ML - (9/CA)

## (undated) DEVICE — DERMABOND ADVANCED - (12EA/BX)

## (undated) DEVICE — GOWN SURGEONS LARGE - (32/CA)

## (undated) DEVICE — TUBE CONNECT SUCTION CLEAR 120 X 1/4" (50EA/CA)"

## (undated) DEVICE — NEEDLE NON SAFETY HYPO 22 GA X 1 1/2 IN (100/BX)

## (undated) DEVICE — PACK LAPAROSCOPY - (1/CA)

## (undated) DEVICE — GLOVE BIOGEL SZ 6.5 SURGICAL PF LTX (50PR/BX 4BX/CA)

## (undated) DEVICE — NEEDLE INSUFFLATION FOR STEP - (12/BX)

## (undated) DEVICE — PACK LAP CHOLE OR - (2EA/CA)

## (undated) DEVICE — CATHETER IV 20 GA X 1-1/4 ---SURG.& SDS ONLY--- (50EA/BX)

## (undated) DEVICE — BANDAID X-LARGE 2 X 4 IN LF (50EA/BX)

## (undated) DEVICE — TUBE E-T HI-LO CUFF 7.0MM (10EA/PK)

## (undated) DEVICE — LIGASURE LAPAROSCOPIC 5MM - (6EA/CA)

## (undated) DEVICE — PENCIL ELECTSURG 10FT BTN SWH - (50/CA)

## (undated) DEVICE — GLOVE BIOGEL PI ORTHO SZ 6 1/2 SURGICAL PF LF (40PR/BX)

## (undated) DEVICE — SCISSORS 5MM CVD (6EA/BX)

## (undated) DEVICE — TRAY SRGPRP PVP IOD WT PRP - (20/CA)

## (undated) DEVICE — SUTURE 0 VICRYL PLUS CT-2 - 27 INCH (36/BX)

## (undated) DEVICE — TROCAR STEP 5MM - (3/CA)

## (undated) DEVICE — TROCAR 5X100 BLADED Z-THREAD - KII (6/BX)